# Patient Record
Sex: FEMALE | Race: BLACK OR AFRICAN AMERICAN | Employment: UNEMPLOYED | URBAN - NONMETROPOLITAN AREA
[De-identification: names, ages, dates, MRNs, and addresses within clinical notes are randomized per-mention and may not be internally consistent; named-entity substitution may affect disease eponyms.]

---

## 2021-01-28 ENCOUNTER — HOSPITAL ENCOUNTER (EMERGENCY)
Age: 61
Discharge: HOME | End: 2021-01-28
Payer: MEDICAID

## 2021-01-28 VITALS
HEART RATE: 91 BPM | OXYGEN SATURATION: 98 % | SYSTOLIC BLOOD PRESSURE: 172 MMHG | HEIGHT: 64 IN | RESPIRATION RATE: 18 BRPM | DIASTOLIC BLOOD PRESSURE: 92 MMHG | BODY MASS INDEX: 29.37 KG/M2 | WEIGHT: 172 LBS | TEMPERATURE: 98.2 F

## 2021-01-28 DIAGNOSIS — J34.89 RHINORRHEA: Primary | ICD-10-CM

## 2021-01-28 PROCEDURE — 99282 EMERGENCY DEPT VISIT SF MDM: CPT

## 2021-01-28 RX ORDER — BISMUTH SUBSALICYLATE 262 MG
1 TABLET,CHEWABLE ORAL DAILY
COMMUNITY

## 2021-01-28 NOTE — ED TRIAGE NOTES
Pt states she has been having sinus problems x approx one week, states saw blood when she blew her nose a couple of days ago.   Pt states she gets a sinus infection every year about this time, denies fever

## 2021-01-28 NOTE — ED PROVIDER NOTES
EMERGENCY DEPARTMENT HISTORY AND PHYSICAL EXAM      Date: 1/28/2021  Patient Name: Missy Medina    History of Presenting Illness     Chief Complaint   Patient presents with    Nasal Congestion       History Provided By: Patient    HPI: Missy Medina, 57 y.o. female presents to the ED with complaints of nasal congestion for about six days. Patient states that she began to feel as if her nose was running about six days ago, and soon began to feel as if she were congested. States that she was unsure if it was a sinus infection, but she was unable to get into her primary physician so she decided to come into the ED. She also states that she has experienced whistling when she breathes through her nostrils, and some blood in the nasal discharge as well. Denies any sinus tenderness. There are no other complaints, changes, or physical findings at this time. PCP: No primary care provider on file. No current facility-administered medications on file prior to encounter. Current Outpatient Medications on File Prior to Encounter   Medication Sig Dispense Refill    LISINOPRIL-HYDROCHLOROTHIAZIDE PO Take  by mouth.  buspirone HCl (BUSPIRONE PO) Take  by mouth.  multivitamin (ONE A DAY) tablet Take 1 Tab by mouth daily.  acetaminophen/chlorpheniramine (CORICIDIN PO) Take  by mouth. Past History     Past Medical History:  Past Medical History:   Diagnosis Date    Endocrine disease     Hypertension        Past Surgical History:  Past Surgical History:   Procedure Laterality Date    HX GYN      HX HYSTERECTOMY         Family History:  History reviewed. No pertinent family history. Social History:  Social History     Tobacco Use    Smoking status: Never Smoker    Smokeless tobacco: Never Used   Substance Use Topics    Alcohol use: Not on file    Drug use: Not on file       Allergies:   Allergies   Allergen Reactions    Latex Itching    Ultram [Tramadol] Nausea Only Review of Systems   Review of Systems   Constitutional: Negative for fatigue and fever. HENT: Positive for congestion and rhinorrhea. Negative for facial swelling, sinus pressure and sinus pain. Eyes: Negative for visual disturbance. Respiratory: Negative for shortness of breath. Cardiovascular: Negative for chest pain and leg swelling. Gastrointestinal: Negative for abdominal pain. Endocrine: Negative for polyuria. Genitourinary: Negative for dysuria. Skin: Negative for rash. Neurological: Negative for weakness. Psychiatric/Behavioral: Negative for behavioral problems. Physical Exam   Physical Exam  Vitals signs and nursing note reviewed. Constitutional:       General: She is not in acute distress. Appearance: Normal appearance. She is normal weight. HENT:      Head: Normocephalic and atraumatic. Nose: Congestion present. Right Turbinates: Swollen. Left Turbinates: Swollen. Right Sinus: No maxillary sinus tenderness or frontal sinus tenderness. Left Sinus: No maxillary sinus tenderness or frontal sinus tenderness. Eyes:      Conjunctiva/sclera: Conjunctivae normal.   Neck:      Musculoskeletal: Neck supple. Cardiovascular:      Rate and Rhythm: Normal rate and regular rhythm. Pulmonary:      Effort: Pulmonary effort is normal.      Breath sounds: Normal breath sounds. Musculoskeletal:         General: No signs of injury. Skin:     General: Skin is warm. Neurological:      Mental Status: She is alert and oriented to person, place, and time. Psychiatric:         Mood and Affect: Mood normal.         Diagnostic Study Results     Labs -   No results found for this or any previous visit (from the past 12 hour(s)). Radiologic Studies -   No orders to display     CT Results  (Last 48 hours)    None        CXR Results  (Last 48 hours)    None            Medical Decision Making   I am the first provider for this patient.     I reviewed the vital signs, available nursing notes, past medical history, past surgical history, family history and social history. Vital Signs-Reviewed the patient's vital signs. Patient Vitals for the past 12 hrs:   Temp Pulse Resp BP SpO2   01/28/21 1429 98.2 °F (36.8 °C) 91 18 (!) 172/92 98 %       Records Reviewed: Nursing Notes    The patient presents with     ED Course:   Initial assessment performed. The patients presenting problems have been discussed, and they are in agreement with the care plan formulated and outlined with them. I have encouraged them to ask questions as they arise throughout their visit. Provider Notes (Medical Decision Making):     Patient with rhinorrhea likely due to combination of dry air and mask wearing. No evidence of a bacterial process. Okay for discharge with supportive care. PROCEDURES      Consultations:     Consultations:     Disposition     Disposition:         PLAN:  1. Current Discharge Medication List        2. Follow-up Information    None       Return to ED if worse     Diagnosis     Clinical Impression: No diagnosis found. By signing my name below, Ebenezer Killian, attest that this documentation has been prepared under the direction and in presence of Dr. Juliana Miguel on 01/28/21.  Electronically signed: Chris Dorman, 01/28/21, 2:34 PM

## 2021-04-06 ENCOUNTER — HOSPITAL ENCOUNTER (EMERGENCY)
Age: 61
Discharge: HOME | End: 2021-04-06
Attending: FAMILY MEDICINE
Payer: MEDICAID

## 2021-04-06 VITALS
HEIGHT: 64 IN | TEMPERATURE: 98.1 F | SYSTOLIC BLOOD PRESSURE: 161 MMHG | BODY MASS INDEX: 28.85 KG/M2 | RESPIRATION RATE: 18 BRPM | HEART RATE: 82 BPM | WEIGHT: 169 LBS | DIASTOLIC BLOOD PRESSURE: 100 MMHG | OXYGEN SATURATION: 99 %

## 2021-04-06 DIAGNOSIS — G44.89 OTHER HEADACHE SYNDROME: Primary | ICD-10-CM

## 2021-04-06 PROCEDURE — 99283 EMERGENCY DEPT VISIT LOW MDM: CPT

## 2021-04-06 PROCEDURE — 74011250637 HC RX REV CODE- 250/637: Performed by: FAMILY MEDICINE

## 2021-04-06 RX ORDER — KETOROLAC TROMETHAMINE 10 MG/1
10 TABLET, FILM COATED ORAL
Qty: 10 TAB | Refills: 0 | Status: SHIPPED | OUTPATIENT
Start: 2021-04-06 | End: 2022-01-14

## 2021-04-06 RX ORDER — KETOROLAC TROMETHAMINE 10 MG/1
10 TABLET, FILM COATED ORAL ONCE
Status: COMPLETED | OUTPATIENT
Start: 2021-04-06 | End: 2021-04-06

## 2021-04-06 RX ADMIN — KETOROLAC TROMETHAMINE 10 MG: 10 TABLET, FILM COATED ORAL at 11:28

## 2021-04-06 NOTE — ED TRIAGE NOTES
Pt states \"I have been having headaches since my Hollis Mejia covid vaccine since 3/7/2021. I see a neurologist in 2 days but I couldn't rest last night so I thought I should come in to get checked out. \"

## 2021-04-06 NOTE — ED PROVIDER NOTES
EMERGENCY DEPARTMENT HISTORY AND PHYSICAL EXAM      Date: 4/6/2021  Patient Name: Roni Alcantar    History of Presenting Illness     Chief Complaint   Patient presents with    Headache       History Provided By: Patient    HPI: Roni Alcantar, 57 y.o. female with a past medical history significant hypertension presents to the ED with cc of headache. Patient states that she has had almost daily headaches since getting the first [address] over a month ago. She states the headache is on the left side of her head on the top of her head. She describes it as a throbbing type headache. She says at its worst it gets to an 8 out of 10. Today is a 6 out of 10. She does get some relief with Tylenol. Its not associated with photophobia or nausea. She denies any fevers or chills. She saw her PCP this past week for recheck of her blood pressure which was slightly elevated on the visit. She has been referred to a neurologist and has an appointment in 2 days. The patient states she came in today because she was having a headache last night that kept her up and she was unable to sleep well. She denies any kind of trouble concentrating, weakness, nausea or vomiting. There are no other complaints, changes, or physical findings at this time. PCP: Mago Bliss NP    No current facility-administered medications on file prior to encounter. Current Outpatient Medications on File Prior to Encounter   Medication Sig Dispense Refill    LISINOPRIL-HYDROCHLOROTHIAZIDE PO Take  by mouth.  buspirone HCl (BUSPIRONE PO) Take  by mouth.  multivitamin (ONE A DAY) tablet Take 1 Tab by mouth daily.  acetaminophen/chlorpheniramine (CORICIDIN PO) Take  by mouth.  MV/FA/D3/K/LYCOP/LUT/HERB#220 (ESTROBLEND PO) Take  by mouth.  meloxicam (MOBIC) 15 mg tablet Take 1 Tab by mouth daily.  30 Tab 2    hydrOXYzine (ATARAX) 25 mg tablet Take  by mouth three (3) times daily as needed for Itching.  amLODIPine (NORVASC) 5 mg tablet Take 5 mg by mouth daily.  ASPIRIN PO Take 81 mg by mouth.  MULTIVITAMIN PO Take  by mouth.  CALCIUM PO Take  by mouth. Past History     Past Medical History:  Past Medical History:   Diagnosis Date    Arthritis     Carpal tunnel syndrome, left     Endocrine disease     Hypertension     Numbness and tingling in left hand        Past Surgical History:  Past Surgical History:   Procedure Laterality Date    HX GYN      HX HYSTERECTOMY  2001    HX HYSTERECTOMY         Family History:  Family History   Problem Relation Age of Onset    Diabetes Unknown     Hypertension Unknown     Heart Disease Unknown     Asthma Unknown     Arthritis-osteo Unknown        Social History:  Social History     Tobacco Use    Smoking status: Never Smoker    Smokeless tobacco: Never Used   Substance Use Topics    Alcohol use: No     Alcohol/week: 0.0 standard drinks    Drug use: No       Allergies: Allergies   Allergen Reactions    Latex Itching    Other Plant, Animal, Environmental Other (comments)     Dust - sneezing, runny nose  Cleaning Products ([employer] [employer]) - eye pain    Ultram [Tramadol] Nausea and Vomiting    Ultram [Tramadol] Nausea Only         Review of Systems     Review of Systems   Constitutional: Negative for fatigue and fever. HENT: Negative for rhinorrhea and sore throat. Respiratory: Negative for cough and shortness of breath. Cardiovascular: Negative for chest pain and palpitations. Gastrointestinal: Positive for nausea. Negative for abdominal pain, diarrhea and vomiting. Genitourinary: Negative for difficulty urinating and dysuria. Musculoskeletal: Negative for arthralgias and myalgias. Skin: Negative for color change and rash. Neurological: Positive for light-headedness and headaches. Physical Exam     Physical Exam  Vitals signs and nursing note reviewed. Constitutional:       General: She is awake.  She is not in acute distress. Appearance: Normal appearance. She is well-developed and normal weight. She is not ill-appearing, toxic-appearing or diaphoretic. Interventions: Face mask in place. HENT:      Head: Normocephalic and atraumatic. Eyes:      Conjunctiva/sclera: Conjunctivae normal.      Pupils: Pupils are equal, round, and reactive to light. Neck:      Musculoskeletal: Normal range of motion and neck supple. Cardiovascular:      Rate and Rhythm: Normal rate and regular rhythm. Pulses: Normal pulses. Heart sounds: Normal heart sounds. Pulmonary:      Effort: Pulmonary effort is normal.      Breath sounds: Normal breath sounds. Abdominal:      General: Abdomen is flat. Palpations: Abdomen is soft. Tenderness: There is no abdominal tenderness. Skin:     General: Skin is warm and dry. Neurological:      General: No focal deficit present. Mental Status: She is alert and oriented to person, place, and time. GCS: GCS eye subscore is 4. GCS verbal subscore is 5. GCS motor subscore is 6. Psychiatric:         Mood and Affect: Mood and affect normal.         Behavior: Behavior normal. Behavior is cooperative. Thought Content: Thought content normal.         Lab and Diagnostic Study Results     Labs -   No results found for this or any previous visit (from the past 12 hour(s)). Radiologic Studies -   @lastxrresult@  CT Results  (Last 48 hours)    None        CXR Results  (Last 48 hours)    None            Medical Decision Making   - I am the first provider for this patient. - I reviewed the vital signs, available nursing notes, past medical history, past surgical history, family history and social history. - Initial assessment performed. The patients presenting problems have been discussed, and they are in agreement with the care plan formulated and outlined with them.   I have encouraged them to ask questions as they arise throughout their visit.    Vital Signs-Reviewed the patient's vital signs. Patient Vitals for the past 12 hrs:   Temp Pulse Resp BP SpO2   04/06/21 1052 98.1 °F (36.7 °C) 82 18 (!) 161/100 99 %       Records Reviewed: Nursing Notes    The patient presents with headache with a differential diagnosis of  cluster headache, migraine, tension headache and vascular headache      ED Course:          Provider Notes (Medical Decision Making):     MDM     6168 -patient presents with on and off headaches since getting a vaccine a month ago. She is neurologically intact. Her blood pressure slightly elevated today. She has a bit of anxiety about this. We will try to give her some reassurance given that she has a completely normal exam is neurologically intact. She has an appointment with the neurologist in 2 days. I will allow the neurologist to do the work-up. I will give her some Toradol here in the department as well as a prescription for the same to use as needed. Procedures   Medical Decision Makingedical Decision Making      Disposition   Disposition:     Discharged    DISCHARGE PLAN:  1. Current Discharge Medication List      START taking these medications    Details   ketorolac (TORADOL) 10 mg tablet Take 1 Tab by mouth every six (6) hours as needed for Pain. Qty: 10 Tab, Refills: 0         CONTINUE these medications which have NOT CHANGED    Details   LISINOPRIL-HYDROCHLOROTHIAZIDE PO Take  by mouth. buspirone HCl (BUSPIRONE PO) Take  by mouth. !! multivitamin (ONE A DAY) tablet Take 1 Tab by mouth daily. acetaminophen/chlorpheniramine (CORICIDIN PO) Take  by mouth. MV/FA/D3/K/LYCOP/LUT/HERB#220 (ESTROBLEND PO) Take  by mouth. Associated Diagnoses: Foot pain, right; Ankle pain, right      meloxicam (MOBIC) 15 mg tablet Take 1 Tab by mouth daily. Qty: 30 Tab, Refills: 2      hydrOXYzine (ATARAX) 25 mg tablet Take  by mouth three (3) times daily as needed for Itching.       amLODIPine (NORVASC) 5 mg tablet Take 5 mg by mouth daily. ASPIRIN PO Take 81 mg by mouth. !! MULTIVITAMIN PO Take  by mouth. CALCIUM PO Take  by mouth. !! - Potential duplicate medications found. Please discuss with provider. 2.   Follow-up Information     Follow up With Specialties Details Why Contact Info    Your Neurologist  In 2 days          3. Return to ED if worse   4. Current Discharge Medication List      START taking these medications    Details   ketorolac (TORADOL) 10 mg tablet Take 1 Tab by mouth every six (6) hours as needed for Pain. Qty: 10 Tab, Refills: 0               Diagnosis     Clinical Impression:   1. Other headache syndrome        Attestations:    Ang Ho MD    Please note that this dictation was completed with [employer], the computer voice recognition software. Quite often unanticipated grammatical, syntax, homophones, and other interpretive errors are inadvertently transcribed by the computer software. Please disregard these errors. Please excuse any errors that have escaped final proofreading. Thank you.

## 2021-11-16 LAB — MAMMOGRAPHY, EXTERNAL: NORMAL

## 2022-01-14 ENCOUNTER — HOSPITAL ENCOUNTER (EMERGENCY)
Age: 62
Discharge: HOME | End: 2022-01-14
Attending: STUDENT IN AN ORGANIZED HEALTH CARE EDUCATION/TRAINING PROGRAM
Payer: MEDICAID

## 2022-01-14 VITALS
HEIGHT: 64 IN | BODY MASS INDEX: 28.17 KG/M2 | WEIGHT: 165 LBS | OXYGEN SATURATION: 97 % | HEART RATE: 67 BPM | TEMPERATURE: 98.7 F | SYSTOLIC BLOOD PRESSURE: 155 MMHG | DIASTOLIC BLOOD PRESSURE: 97 MMHG | RESPIRATION RATE: 18 BRPM

## 2022-01-14 DIAGNOSIS — R19.7 DIARRHEA, UNSPECIFIED TYPE: Primary | ICD-10-CM

## 2022-01-14 PROCEDURE — 99282 EMERGENCY DEPT VISIT SF MDM: CPT

## 2022-01-14 RX ORDER — LOPERAMIDE HYDROCHLORIDE 2 MG/1
2 CAPSULE ORAL
Qty: 20 CAPSULE | Refills: 0 | Status: SHIPPED | OUTPATIENT
Start: 2022-01-14 | End: 2022-01-24

## 2022-01-14 RX ORDER — CALCIUM CARB/VITAMIN D3/VIT K1 500-500-40
TABLET,CHEWABLE ORAL
COMMUNITY

## 2022-01-14 RX ORDER — LANOLIN ALCOHOL/MO/W.PET/CERES
CREAM (GRAM) TOPICAL DAILY
COMMUNITY

## 2022-01-14 NOTE — ED PROVIDER NOTES
HPI   Patient is a 43-year-old female who presents for diarrhea. Of note patient has been having diarrhea for the last 2 months. She says she frequently has had frequent stools her whole life over the past 2 months has noticed it has been a little worse than normal.  She typically has approximately seven bowel movements per day. It is nonbloody and not watery. She denies any recent sick contacts, COVID exposures or antibiotic use. She denies any pain or discomfort associated with it. Today she had four bowel movements within the first hour of waking and was associated with some mild nausea so she felt that it was worsening and decided to come to the emergency department. She is never seen any blood. Did not have any vomiting. Still does not have any abdominal discomfort. Denies any runny nose, sore throat or cough. She does have a PCP. Her last colonoscopy was approximately 5 years ago and she had a polyp at that time she was told to follow-up and is due for colonoscopy this year.     Past Medical History:   Diagnosis Date    Arthritis     Carpal tunnel syndrome, left     Endocrine disease     Hypertension     Numbness and tingling in left hand        Past Surgical History:   Procedure Laterality Date    HX GYN      HX HYSTERECTOMY  2001    HX HYSTERECTOMY           Family History:   Problem Relation Age of Onset    Diabetes Other     Hypertension Other     Heart Disease Other     Asthma Other     OSTEOARTHRITIS Other        Social History     Socioeconomic History    Marital status:      Spouse name: Not on file    Number of children: Not on file    Years of education: Not on file    Highest education level: Not on file   Occupational History    Not on file   Tobacco Use    Smoking status: Never Smoker    Smokeless tobacco: Never Used   Substance and Sexual Activity    Alcohol use: No     Alcohol/week: 0.0 standard drinks    Drug use: No    Sexual activity: Not on file Other Topics Concern    Not on file   Social History Narrative    ** Merged History Encounter **          Social Determinants of Health     Financial Resource Strain:     Difficulty of Paying Living Expenses: Not on file   Food Insecurity:     Worried About Running Out of Food in the Last Year: Not on file    Cynthia of Food in the Last Year: Not on file   Transportation Needs:     Lack of Transportation (Medical): Not on file    Lack of Transportation (Non-Medical): Not on file   Physical Activity:     Days of Exercise per Week: Not on file    Minutes of Exercise per Session: Not on file   Stress:     Feeling of Stress : Not on file   Social Connections:     Frequency of Communication with Friends and Family: Not on file    Frequency of Social Gatherings with Friends and Family: Not on file    Attends Denominational Services: Not on file    Active Member of [address] [place] [address] [employer] or Organizations: Not on file    Attends Club or Organization Meetings: Not on file    Marital Status: Not on file   Intimate Partner Violence:     Fear of Current or Ex-Partner: Not on file    Emotionally Abused: Not on file    Physically Abused: Not on file    Sexually Abused: Not on file   Housing Stability:     Unable to Pay for Housing in the Last Year: Not on file    Number of [place] in the Last Year: Not on file    Unstable Housing in the Last Year: Not on file         ALLERGIES: Latex;  Other plant, animal, environmental; Ultram [tramadol]; and Ultram [tramadol]    Review of Systems  Constitutional: No fever  HENT: No ear pain  Eyes: No change in vision  Respiratory: No SOB  Cardio: No chest pain  GI: No blood in stool  : No hematuria  MSK: No back pain  Skin: No rashes  Neuro: No headache    Vitals:    01/14/22 0914   BP: (!) 155/97   Pulse: 67   Resp: 18   Temp: 98.7 °F (37.1 °C)   SpO2: 97%   Weight: 74.8 kg (165 lb)   Height: 5' 4\" (1.626 m)            Physical Exam   General: No acute distress  Head: Normocephalic, atraumatic  Psych: Cooperative and alert  Eyes: No scleral icterus, normal conjunctiva  ENT: Moist oral mucosa  Neck: Supple  CV: Regular rate and rhythm, no pitting edema, palpable radial pulses  Pulm: Clear breath sounds bilaterally without any wheezing or rhonchi, normal respiratory rate  GI: Normal bowel sounds, soft, non-tender  MSK: Moves all four extremities  Skin: No rashes  Neuro: Alert and conversive    MDM   Patient is 41-year-old female who presents with acute on chronic diarrhea. Patient overall is hemodynamically stable and overall appears well. She has been keeping up with hydration status and does not appear dehydrated. She otherwise been eating and drinking normally. She does not have any concerning findings of abdominal pain or tenderness on exam.  Does not have any concerning findings of bleeding in her stool. Does not have any risk factors for C. difficile, parasites or other acute findings. She does have a chronic issue with diarrhea but is never been diagnosed with IBS or inflammatory bowel. From an emergency standpoint she overall appears well. We discussed the importance of keeping up with hydration. We discussed taking Imodium as needed if she is having multiple bowel movements in a day. However I do not think she needs further work-up here in the emergency department. She will be referred to GI    Patient stable for discharge at this time. Patient is in agreement with the plan to be discharged at this time. All the patient's questions were answered. Patient was given written instructions on the diagnosis, and states understanding of the plan moving forward. We did discuss important signs and symptoms that should prompt quick return to the emergency department. Disposition: Patient was discharged home in stable condition.   They will follow up with GI    Prescriptions: Imodium    Diagnosis: Acute on chronic diarrhea    Procedures

## 2022-01-14 NOTE — ED TRIAGE NOTES
Pt states she has always had stomach issues, but for the past 2 months she has had diarrhea. Pt has not called PCP, nor has she taken immodium for the problem. Pt denies vomiting, fevers, or pain.

## 2022-02-10 ENCOUNTER — TRANSCRIBE ORDER (OUTPATIENT)
Dept: SCHEDULING | Age: 62
End: 2022-02-10

## 2022-02-10 DIAGNOSIS — R07.81 RIB PAIN ON RIGHT SIDE: Primary | ICD-10-CM

## 2022-02-15 ENCOUNTER — HOSPITAL ENCOUNTER (OUTPATIENT)
Dept: GENERAL RADIOLOGY | Age: 62
Discharge: HOME | End: 2022-02-15
Payer: MEDICAID

## 2022-02-15 ENCOUNTER — HOSPITAL ENCOUNTER (OUTPATIENT)
Dept: ULTRASOUND IMAGING | Age: 62
Discharge: HOME | End: 2022-02-15
Payer: MEDICAID

## 2022-02-15 ENCOUNTER — TRANSCRIBE ORDER (OUTPATIENT)
Dept: REGISTRATION | Age: 62
End: 2022-02-15

## 2022-02-15 DIAGNOSIS — R07.81 RIB PAIN ON RIGHT SIDE: ICD-10-CM

## 2022-02-15 DIAGNOSIS — R07.81 RIB PAIN ON RIGHT SIDE: Primary | ICD-10-CM

## 2022-02-15 PROCEDURE — 76705 ECHO EXAM OF ABDOMEN: CPT

## 2022-02-15 PROCEDURE — 71101 X-RAY EXAM UNILAT RIBS/CHEST: CPT

## 2022-03-03 ENCOUNTER — OFFICE VISIT (OUTPATIENT)
Dept: GASTROENTEROLOGY | Age: 62
End: 2022-03-03
Payer: MEDICAID

## 2022-03-03 VITALS
SYSTOLIC BLOOD PRESSURE: 125 MMHG | WEIGHT: 165 LBS | HEART RATE: 75 BPM | BODY MASS INDEX: 28.32 KG/M2 | DIASTOLIC BLOOD PRESSURE: 67 MMHG

## 2022-03-03 DIAGNOSIS — R19.7 DIARRHEA, UNSPECIFIED TYPE: Primary | ICD-10-CM

## 2022-03-03 DIAGNOSIS — I10 PRIMARY HYPERTENSION: ICD-10-CM

## 2022-03-03 DIAGNOSIS — Z80.0 FAMILY HISTORY OF COLON CANCER: ICD-10-CM

## 2022-03-03 DIAGNOSIS — Z86.010 HISTORY OF COLON POLYPS: ICD-10-CM

## 2022-03-03 PROCEDURE — 99203 OFFICE O/P NEW LOW 30 MIN: CPT | Performed by: INTERNAL MEDICINE

## 2022-03-03 NOTE — LETTER
3/3/2022    Patient: Colleen Wakefield   YOB: 1960   Date of Visit: 3/3/2022     Maikol Bauer NP  [facility] [id number]  Via Fax: [phone]    Dear Maikol Bauer NP,      Thank you for referring Ms. Annette Cifuentes to [address] [place] [address] for evaluation. My notes for this consultation are attached. If you have questions, please do not hesitate to call me. I look forward to following your patient along with you.       Sincerely,    Guevara Marino MD

## 2022-03-03 NOTE — PROGRESS NOTES
Mayuri Rivera presents today for   Chief Complaint   Patient presents with    New Patient     Back in January patient was having bad diarrhea and since then has now gotten better. Patient thinks the diarrhea came from eating lots of veggies and nuts. ( Last colon was in 2017 due December of this year procedure done by charly)       Is someone accompanying this pt? no    Is the patient using any DME equipment during [address]? no    Depression Screening:  3 most recent PHQ Screens 3/3/2022   Little interest or pleasure in doing things Not at all   Feeling down, depressed, irritable, or hopeless Not at all   Total Score PHQ 2 0       Learning Assessment:  Learning Assessment 10/8/2015   PRIMARY LEARNER Patient   PRIMARY LANGUAGE ENGLISH   LEARNER PREFERENCE PRIMARY LISTENING   ANSWERED BY patient   RELATIONSHIP SELF       Fall Risk  No flowsheet data found. Coordination of Care:  1. Have you been to the ER, urgent care clinic since your last visit? Hospitalized since your last visit? Yes, [facility] for diarrhea    2. Have you seen or consulted any other health care providers outside of the [address] [place] [address] since your last visit? Include any pap smears or colon screening.  Yes, PCP Christen Mojica

## 2022-03-03 NOTE — PROGRESS NOTES
Referring Physician:  Anya Chang NP     Chief Complaint: Diarrhea    Date of service: 03/03/22     Subjective:     History of Present Illness:  Patient is a female BLACK/ 60 y.o.  who is seen for evaluation for diarrhea. The patient has GI complaints of diarrhea intermittently for 4-5 years, occurring 2-3x a week, when eats lots of fruits and vegetables. He diarrhea worsened 12/2021 to 4-5 x a day. She went to the emergency room 01/2022 because of progressive diarrhea. She had no rectal bleeding or melena. No weight loss. She was started on Imodium A-D as needed and reports her diarrhea has resolved. Presently only has diarrhea when eats lots of mixed vegetables and/or milk. Also worse with stress. 2/2022 ultrasound was normal without GI pathology. The patient denies nausea, vomiting, fever, chills, abdominal pain, reflux, dysphagia, change in bowel habits,  constipation, weight loss, rectal bleeding, or melena. Last EGD- never. Last colonoscopy-1/2017 for history of colon polyps. .  Family history for GI disease is significant for brother at age 52. The patient denies liver related risk factors. Past medical history is significant for carpal tunnel syndrome on the left, hypertension, intermittent chronic diarrhea. PMH:  Past Medical History:   Diagnosis Date    Arthritis     Carpal tunnel syndrome, left     Endocrine disease     Hypertension     Numbness and tingling in left hand         PSH:  Past Surgical History:   Procedure Laterality Date    HX GYN      HX HYSTERECTOMY  2001    HX HYSTERECTOMY          Allergies:   Allergies   Allergen Reactions    Latex Itching    Other Plant, Animal, Environmental Other (comments)     Dust - sneezing, runny nose  Cleaning Products (Pine Sol) - eye pain    Ultram [Tramadol] Nausea and Vomiting    Ultram [Tramadol] Nausea Only        Home Medications:  Cannot display prior to admission medications because the patient has not been admitted in this contact. Hospital Medications:  Current Outpatient Medications   Medication Sig    fluticasone propionate (FLONASE ALLERGY RELIEF NA) by Nasal route.  escitalopram oxalate (LEXAPRO PO) Take  by mouth.  TRIAMCINOLONE ACETON-SILICONES EX by Apply Externally route.  peg 400-propylene glycol (SYSTANE) 0.4-0.3 % drop as needed.  cyanocobalamin 1,000 mcg tablet Take  by mouth daily.  cholecalciferol, vitamin d3, (Vitamin D3) 10 mcg (400 unit) cap Take  by mouth.  LISINOPRIL-HYDROCHLOROTHIAZIDE PO Take  by mouth.  multivitamin (ONE A DAY) tablet Take 1 Tab by mouth daily. No current facility-administered medications for this visit. Social History:  Social History     Tobacco Use    Smoking status: Never Smoker    Smokeless tobacco: Never Used   Substance Use Topics    Alcohol use: No     Alcohol/week: 0.0 standard drinks        Pt denies any history of IV drug use, blood transfusions. Family History:  Family History   Problem Relation Age of Onset    Diabetes Other     Hypertension Other     Heart Disease Other     Asthma Other     OSTEOARTHRITIS Other         Review of Systems:  A detailed 10 system ROS is obtained, with pertinent positives as listed above. All others are negative unless listed in history above. Constitutional denies fever chills, headache, or weight loss. Skin- denies lesions or rashes. HEENT- denies any vision or hearing problems, epistaxis, sore throat, or dental problems. Lungs- no shortness of breath or chest pain reported, no dyspnea on exertion. Cardiac- no palpitations or chest pain reported, including at rest or on exertion. GI-no abdominal pain, melena, rectal bleeding, reflux, dysphagia, jaundice, change in stool or urine color, constipation or diarrhea. Genitourinary -no dysuria or hematuria. Musculoskeletal-no muscle weakness or disuse or atrophy.   Neurologic-no numbness, tingling, gait disturbance, or other abnormalities. Rheumatologic- patient denies any immune or rheumatologic diseases or symptoms. Endocrine- patient denies any endocrine abnormalities including thyroid disease or diabetes. Psychologic-patient denies depression, anxiety or emotional issues. No reported memory issues. Objective:     Physical Exam:  Vitals: /67 (BP 1 Location: Right arm, BP Patient Position: Sitting)   Pulse 75   Wt 74.8 kg (165 lb)   BMI 28.32 kg/m²    Gen:  Pt is alert, cooperative, no acute distress  Skin:  Extremities and face reveal no rashes. No serrano erythema. No telangiectasias on the chest wall. HEENT: Sclerae anicteric. Extra-occular muscles are intact. No oral ulcers. No abnormal pigmentation of the lips. The neck is supple. Cardiovascular: Regular rate and rhythm. No murmurs, gallops, or rubs. Respiratory:  Comfortable breathing with no accessory muscle use. Clear breath sounds anteriorly with no wheezes, rales, or rhonchi. GI:  Abdomen nondistended, soft, and nontender. Normal active bowel sounds. No enlargement of the liver or spleen. No masses palpable. Rectal:  Deferred  Musculoskeletal:   No costovertebral tenderness. No localized muscle weakness, or decreased range of motion. Extremities:  No palpable cords or pitting edema of the lower legs. Extremities have good range of motion. Neurological:  Gross memory appears intact. Patient is alert and oriented. Psychiatric:  Mood appears appropriate with judgement intact. Lymphatic:  No cervical or supraclavicular adenopathy. Laboratory:   No results        CT scan of abdomen and pelvis - No results  CT Results (most recent):  No results found for this or any previous visit.          Abdominal US- No results  US Results (most recent):  Results from [place] encounter on 02/15/22    US ABD LTD    Narrative  EXAM: Limited right upper quadrant abdominal ultrasound    INDICATION: Right rib pain.    COMPARISON: None. TECHNIQUE: Real-time abdomen/right upper quadrant sonography in multiple planes  was performed with image documentation. Grayscale, color flow Doppler imaging,  and velocity spectral waveform analysis of the portal vein was performed (duplex  imaging). _______________    FINDINGS:    LIVER: Normal in echotexture. No focal mass. Scattered hepatic cysts measuring  up to 2.7 cm. Color flow Doppler and velocity spectral waveform analysis of the  portal vein shows normal (hepatopetal) direction of flow. Margurette Ventura BILIARY SYSTEM: No intrahepatic biliary dilatation. Common bile duct is normal  in caliber measuring 0.5 cm. GALLBLADDER: No gallstones or gallbladder wall thickening. No pericholecystic  fluid. RIGHT KIDNEY: 10.3 cm in length. No hydronephrosis or renal mass. No visible  calculi. PANCREAS: Head and body are unremarkable in appearance though the tail is  obscured by overlying bowel gas. IVC: Visualized portions are unremarkable in appearance. OTHER: No free intraperitoneal fluid.    _______________    Impression  No gallstones or secondary findings of cholecystitis. MRI and MRCP- No results  MRI Results (most recent):  No results found for this or any previous visit. Assessment:     1. Intermittent chronic diarrhea. I feel her symptom complex is due to irritable bowel syndrome, aggravated by stress and diet (vegetables, fruits, milk). Is resolved with minimal treatment in the form of Imodium A-D, which she uses as needed. There is no warning signs to suggest malignancy or inflammatory bowel disease. 2. History of colon polyps. She is due for surveillance colonoscopy. 3. Hypertension. She is on oral medication for this problem. Plan:     1. Continue to use Imodium AD 1-4x a day as needed for diarrhea, adjusting to effect. Patient also can use it  prophylactically and see if this is beneficial.  2. Colonoscopy with MAC.   Bowel prep magnesium citrate. I discussed the techniques involved with the procedure as well as the risks, benefits, and alternatives including but not limited to bleeding, infection, perforation requiring emergent surgery, missing lesions, death, and anesthesia related complications with the patient. All questions and concerns were answered. The patient voiced understanding and agrees to proceed. She wants to wait and do it sometime later this year. She will call us when she is ready to proceed. 3. Further recommendations pending the patient's clinical course, if needed. 4. The patient will follow up with me as needed.           Chitra Cruz MD

## 2022-06-10 LAB
CREATININE, EXTERNAL: 0.94
LDL-C, EXTERNAL: 168

## 2022-07-11 ENCOUNTER — ANESTHESIA EVENT (OUTPATIENT)
Dept: ENDOSCOPY | Age: 62
End: 2022-07-11
Payer: MEDICAID

## 2022-07-11 RX ORDER — INSULIN LISPRO 100 [IU]/ML
INJECTION, SOLUTION INTRAVENOUS; SUBCUTANEOUS ONCE
Status: CANCELLED | OUTPATIENT
Start: 2022-07-11 | End: 2022-07-11

## 2022-07-14 ENCOUNTER — PREP FOR PROCEDURE (OUTPATIENT)
Dept: GASTROENTEROLOGY | Age: 62
End: 2022-07-14

## 2022-07-14 RX ORDER — SODIUM CHLORIDE, SODIUM LACTATE, POTASSIUM CHLORIDE, CALCIUM CHLORIDE 600; 310; 30; 20 MG/100ML; MG/100ML; MG/100ML; MG/100ML
75 INJECTION, SOLUTION INTRAVENOUS CONTINUOUS
Status: CANCELLED | OUTPATIENT
Start: 2022-07-14 | End: 2022-07-14

## 2022-07-14 NOTE — H&P
Date of Surgery Update:  Sebastien iLriano was seen and examined. History and physical has been reviewed. The patient has been examined.  There have been no significant clinical changes since the completion of the originally dated History and Physical.    Signed By: Bartolo Sadler MD     July 14, 2022 11:52 AM

## 2022-07-19 ENCOUNTER — ANESTHESIA (OUTPATIENT)
Dept: ENDOSCOPY | Age: 62
End: 2022-07-19
Payer: MEDICAID

## 2022-07-19 ENCOUNTER — HOSPITAL ENCOUNTER (OUTPATIENT)
Age: 62
Setting detail: OUTPATIENT SURGERY
Discharge: HOME | End: 2022-07-19
Attending: INTERNAL MEDICINE | Admitting: INTERNAL MEDICINE
Payer: MEDICAID

## 2022-07-19 VITALS
OXYGEN SATURATION: 100 % | HEART RATE: 57 BPM | HEIGHT: 64 IN | DIASTOLIC BLOOD PRESSURE: 74 MMHG | WEIGHT: 167 LBS | SYSTOLIC BLOOD PRESSURE: 134 MMHG | TEMPERATURE: 97.4 F | RESPIRATION RATE: 16 BRPM | BODY MASS INDEX: 28.51 KG/M2

## 2022-07-19 PROCEDURE — 74011250636 HC RX REV CODE- 250/636: Performed by: NURSE ANESTHETIST, CERTIFIED REGISTERED

## 2022-07-19 PROCEDURE — 77030042138 HC TBNG SPECIAL -A: Performed by: INTERNAL MEDICINE

## 2022-07-19 PROCEDURE — 76060000031 HC ANESTHESIA FIRST 0.5 HR: Performed by: INTERNAL MEDICINE

## 2022-07-19 PROCEDURE — 88305 TISSUE EXAM BY PATHOLOGIST: CPT

## 2022-07-19 PROCEDURE — 77030018831 HC SOL IRR H20 BAXT -A: Performed by: INTERNAL MEDICINE

## 2022-07-19 PROCEDURE — 77030037186 HC VLV ENDOSC STRL DEFENDO DISP MVAT -A: Performed by: INTERNAL MEDICINE

## 2022-07-19 PROCEDURE — 76040000019: Performed by: INTERNAL MEDICINE

## 2022-07-19 PROCEDURE — 2709999900 HC NON-CHARGEABLE SUPPLY: Performed by: INTERNAL MEDICINE

## 2022-07-19 PROCEDURE — 77030009426 HC FCPS BIOP ENDOSC BSC -B: Performed by: INTERNAL MEDICINE

## 2022-07-19 PROCEDURE — 45380 COLONOSCOPY AND BIOPSY: CPT | Performed by: INTERNAL MEDICINE

## 2022-07-19 RX ORDER — SODIUM CHLORIDE 0.9 % (FLUSH) 0.9 %
5-40 SYRINGE (ML) INJECTION AS NEEDED
Status: DISCONTINUED | OUTPATIENT
Start: 2022-07-19 | End: 2022-07-19 | Stop reason: HOSPADM

## 2022-07-19 RX ORDER — PROPOFOL 10 MG/ML
INJECTION, EMULSION INTRAVENOUS AS NEEDED
Status: DISCONTINUED | OUTPATIENT
Start: 2022-07-19 | End: 2022-07-19 | Stop reason: HOSPADM

## 2022-07-19 RX ORDER — SODIUM CHLORIDE, SODIUM LACTATE, POTASSIUM CHLORIDE, CALCIUM CHLORIDE 600; 310; 30; 20 MG/100ML; MG/100ML; MG/100ML; MG/100ML
INJECTION, SOLUTION INTRAVENOUS
Status: DISCONTINUED | OUTPATIENT
Start: 2022-07-19 | End: 2022-07-19 | Stop reason: HOSPADM

## 2022-07-19 RX ORDER — SODIUM CHLORIDE 0.9 % (FLUSH) 0.9 %
5-40 SYRINGE (ML) INJECTION EVERY 8 HOURS
Status: CANCELLED | OUTPATIENT
Start: 2022-07-19

## 2022-07-19 RX ORDER — SODIUM CHLORIDE 0.9 % (FLUSH) 0.9 %
5-40 SYRINGE (ML) INJECTION AS NEEDED
Status: CANCELLED | OUTPATIENT
Start: 2022-07-19

## 2022-07-19 RX ORDER — SODIUM CHLORIDE 0.9 % (FLUSH) 0.9 %
5-40 SYRINGE (ML) INJECTION EVERY 8 HOURS
Status: DISCONTINUED | OUTPATIENT
Start: 2022-07-19 | End: 2022-07-19 | Stop reason: HOSPADM

## 2022-07-19 RX ORDER — ONDANSETRON 2 MG/ML
4 INJECTION INTRAMUSCULAR; INTRAVENOUS ONCE
Status: CANCELLED | OUTPATIENT
Start: 2022-07-19 | End: 2022-07-19

## 2022-07-19 RX ORDER — SODIUM CHLORIDE, SODIUM LACTATE, POTASSIUM CHLORIDE, CALCIUM CHLORIDE 600; 310; 30; 20 MG/100ML; MG/100ML; MG/100ML; MG/100ML
25 INJECTION, SOLUTION INTRAVENOUS CONTINUOUS
Status: DISCONTINUED | OUTPATIENT
Start: 2022-07-19 | End: 2022-07-19 | Stop reason: HOSPADM

## 2022-07-19 RX ADMIN — PROPOFOL 50 MG: 10 INJECTION, EMULSION INTRAVENOUS at 11:19

## 2022-07-19 RX ADMIN — PROPOFOL 100 MG: 10 INJECTION, EMULSION INTRAVENOUS at 11:06

## 2022-07-19 RX ADMIN — PROPOFOL 25 MG: 10 INJECTION, EMULSION INTRAVENOUS at 11:12

## 2022-07-19 RX ADMIN — PROPOFOL 50 MG: 10 INJECTION, EMULSION INTRAVENOUS at 11:14

## 2022-07-19 RX ADMIN — SODIUM CHLORIDE, POTASSIUM CHLORIDE, SODIUM LACTATE AND CALCIUM CHLORIDE: 600; 310; 30; 20 INJECTION, SOLUTION INTRAVENOUS at 11:02

## 2022-07-19 RX ADMIN — SODIUM CHLORIDE, POTASSIUM CHLORIDE, SODIUM LACTATE AND CALCIUM CHLORIDE 25 ML/HR: 600; 310; 30; 20 INJECTION, SOLUTION INTRAVENOUS at 08:27

## 2022-07-19 NOTE — ANESTHESIA PREPROCEDURE EVALUATION
Relevant Problems   No relevant active problems       Anesthetic History   No history of anesthetic complications            Review of Systems / Medical History  Patient summary reviewed, nursing notes reviewed and pertinent labs reviewed    Pulmonary  Within defined limits                 Neuro/Psych   Within defined limits           Cardiovascular  Within defined limits  Hypertension              Exercise tolerance: >4 METS     GI/Hepatic/Renal  Within defined limits              Endo/Other  Within defined limits      Arthritis     Other Findings              Physical Exam    Airway  Mallampati: II  TM Distance: 4 - 6 cm  Neck ROM: normal range of motion   Mouth opening: Normal     Cardiovascular  Regular rate and rhythm,  S1 and S2 normal,  no murmur, click, rub, or gallop  Rhythm: regular  Rate: normal         Dental  No notable dental hx       Pulmonary  Breath sounds clear to auscultation               Abdominal  GI exam deferred       Other Findings            Anesthetic Plan    ASA: 2  Anesthesia type: MAC          Induction: Intravenous  Anesthetic plan and risks discussed with: Patient

## 2022-07-19 NOTE — INTERVAL H&P NOTE
Update History & Physical    The Patient's History and Physical of July 19, 2022  The procedure was reviewed with the patient and I examined the patient. There was no change. The surgical site was confirmed by the patient and me. Plan:  The risk, benefits, expected outcome, and alternative to the recommended procedure have been discussed with the patient. Patient understands and wants to proceed with the procedure.     Electronically signed by Luzmaria Glass MD on 7/19/2022 at 8:49 AM

## 2022-07-19 NOTE — DISCHARGE INSTRUCTIONS
1. Check pathology. Will notify patient with results when they are available. 2. Repeat colonoscopy in 5 years for surveillance. 3. Follow up as needed.

## 2022-07-19 NOTE — PROCEDURES
Colonoscopy procedure note    Date of service: 07/19/22     Type: Surveillance    Indication for procedure: History of colon polyps, diarrhea. Anesthesia classification: ASA class 2    Patient history and physical been accomplished and documented. Patient is assessed and determined to be appropriate candidate for planned procedure and sedation; patient reassessed immediately prior to sedation. Sedation plan: MAC per anesthesia    Surgical assistant: Not applicable    Airway assessment: Range of motion: Normal, mouth opening, Visual obstruction: No.    UPDATED PREOP EXAM:  Unchanged. VS: Reviewed  Gen: in NAD  CV: RRR, no murmur  Resp: CTA  Abd: Soft, NTND, +BS  Extrem: No cyanosis or edema  Neuro: Awake and alert    Informed consent obtained: Yes. The indications, risks including but not limited to bleeding, perforation, infection, death, and potential failure to visual areas are diagnosed neoplasia, alternatives and benefits were discussed with the patient prior to the procedure. Patient identity and procedure was verified, absent was obtained, and is consistent with the consent form found in the patient's records. PROCEDURE PERFORMED:  COLONOSCOPY  to the cecum with MAC the polypectomy of small sessile sigmoid colon polyp. INSTRUMENT: Olympus colonoscope per nursing notes. FINDINGS:    External anal lesions: Normal   Rectum: normal.   Retroflexion view: Grade 1 internal hemorrhoids. Sigmoid: normal except for diverticulosis. There is also a small sessile polyp removed by forcep polypectomy without incident. Descending Colon: normal   Transverse Colon: normal   Ascending Colon: normal   Cecum: normal   Terminal ileum: not evaluated     Specimens: 1. Forcep polypectomy of small sessile sigmoid colon polyp. Bowel preparation- adequate to detect small (5mm) polyps or larger. Estimated blood loss: none   Complications:  none   Cecal withdrawal time: 6 minutes.     Comments: none    Impression:  1. Forcep polypectomy of small sessile sigmoid colon polyp. 2. Sigmoid diverticulosis. 3. Grade 1 internal hemorrhoids. Recommendations:  1. Check pathology. Will notify patient with results when they are available. 2. Repeat colonoscopy in 5 years for surveillance. 3. Follow up as needed.

## 2022-08-11 ENCOUNTER — OFFICE VISIT (OUTPATIENT)
Dept: FAMILY MEDICINE CLINIC | Age: 62
End: 2022-08-11
Payer: MEDICAID

## 2022-08-11 ENCOUNTER — HOSPITAL ENCOUNTER (OUTPATIENT)
Dept: GENERAL RADIOLOGY | Age: 62
Discharge: HOME | End: 2022-08-11
Attending: NURSE PRACTITIONER
Payer: MEDICAID

## 2022-08-11 VITALS
HEART RATE: 67 BPM | SYSTOLIC BLOOD PRESSURE: 127 MMHG | RESPIRATION RATE: 18 BRPM | BODY MASS INDEX: 28.17 KG/M2 | OXYGEN SATURATION: 98 % | WEIGHT: 165 LBS | DIASTOLIC BLOOD PRESSURE: 75 MMHG | HEIGHT: 64 IN

## 2022-08-11 DIAGNOSIS — M79.642 LEFT HAND PAIN: ICD-10-CM

## 2022-08-11 DIAGNOSIS — I10 PRIMARY HYPERTENSION: ICD-10-CM

## 2022-08-11 DIAGNOSIS — F41.8 DEPRESSION WITH ANXIETY: ICD-10-CM

## 2022-08-11 DIAGNOSIS — K21.9 GASTROESOPHAGEAL REFLUX DISEASE WITHOUT ESOPHAGITIS: ICD-10-CM

## 2022-08-11 DIAGNOSIS — M79.642 LEFT HAND PAIN: Primary | ICD-10-CM

## 2022-08-11 PROBLEM — E78.5 HYPERLIPIDEMIA: Status: ACTIVE | Noted: 2019-04-01

## 2022-08-11 PROBLEM — J30.9 ALLERGIC RHINITIS: Status: ACTIVE | Noted: 2018-09-24

## 2022-08-11 PROCEDURE — 99203 OFFICE O/P NEW LOW 30 MIN: CPT | Performed by: NURSE PRACTITIONER

## 2022-08-11 PROCEDURE — 73130 X-RAY EXAM OF HAND: CPT

## 2022-08-11 RX ORDER — OMEPRAZOLE 20 MG/1
20 CAPSULE, DELAYED RELEASE ORAL DAILY
Qty: 90 CAPSULE | Refills: 3 | Status: SHIPPED | OUTPATIENT
Start: 2022-08-11

## 2022-08-11 RX ORDER — NYSTATIN 100000 [USP'U]/G
POWDER TOPICAL
COMMUNITY
Start: 2022-05-25

## 2022-08-11 NOTE — PROGRESS NOTES
History of Present Illness  Sheri Oates is a 60 y.o. female who presents today to [facility]. She has previously been seen by [facility]. PMH of HTN, depression/anxiety, and GERD. Reports having labs done a couple of months ago will request medical records. She has several complaints today including left hand pain to serrano side over the 2nd MCP, and increase reflux symptoms. Denies any chest pain, shortness of breath, nausea or vomiting. Appetite is good. Chief Complaint   Patient presents with    [facility]     Right hand finger nail first finger drying out. Left hand first finger hurts          Past Medical History:   Diagnosis Date    Arthritis     Carpal tunnel syndrome, left     Endocrine disease     Hypertension     Numbness and tingling in left hand         Past Surgical History:   Procedure Laterality Date    COLONOSCOPY N/A 7/19/2022    COLONOSCOPY performed by Christian Quintana MD at [facility] ENDOSCOPY    HX GYN      HX HYSTERECTOMY  2001    HX HYSTERECTOMY          Current Medications  Current Outpatient Medications   Medication Sig    Nystop powder APPLY 1 POWDER TOPICALLY TWICE DAILY    omeprazole (PRILOSEC) 20 mg capsule Take 1 Capsule by mouth in the morning. fluticasone propionate (FLONASE ALLERGY RELIEF NA) by Nasal route. escitalopram oxalate (LEXAPRO PO) Take  by mouth. TRIAMCINOLONE ACETON-SILICONES EX by Apply Externally route. peg 400-propylene glycol (SYSTANE) 0.4-0.3 % drop as needed. cyanocobalamin 1,000 mcg tablet Take  by mouth daily. cholecalciferol, vitamin d3, 10 mcg (400 unit) cap Take  by mouth. LISINOPRIL-HYDROCHLOROTHIAZIDE PO Take  by mouth.    multivitamin (ONE A DAY) tablet Take 1 Tab by mouth daily. No current facility-administered medications for this visit.          Allergies   Allergen Reactions    Latex, Natural Rubber Itching    Latex Itching    Tramadol Nausea and Vomiting     Other reaction(s): gi distress    Other Plant, Animal, Environmental Other (comments)     Dust - sneezing, runny nose  Cleaning Products (Pine Sol) - eye pain    Ultram [Tramadol] Nausea Only          Family History   Problem Relation Age of Onset    Diabetes Other     Hypertension Other     Heart Disease Other     Asthma Other     OSTEOARTHRITIS Other           Social History     Tobacco Use    Smoking status: Never    Smokeless tobacco: Never   Vaping Use    Vaping Use: Never used   Substance Use Topics    Alcohol use: No     Alcohol/week: 0.0 standard drinks    Drug use: No        Health Maintenance   Topic Date Due    Hepatitis C Screening  Never done    Lipid Screen  Never done    COVID-19 Vaccine (4 - Booster for Pfizer series) 12/22/2022 (Originally 7/23/2022)    DTaP/Tdap/Td series (1 - Tdap) 08/11/2023 (Originally 1/1/1980)    Shingrix Vaccine Age 50> (1 of 2) 08/26/2023 (Originally 1/1/2009)    Flu Vaccine (1) 09/01/2022    Depression Screen  03/03/2023    Breast Cancer Screen Mammogram  11/16/2023    Colorectal Cancer Screening Combo  07/19/2027    Pneumococcal 0-64 years  Aged [employer] History   Administered Date(s) Administered    COVID-19, PFIZER PURPLE top, DILUTE for use, (age 15 y+), IM, 30mcg/0.3mL 03/07/2021, 09/03/2021, 03/23/2022    Influenza Vaccine 12/16/2016, 10/22/2018       Review of Systems  Review of Systems   Musculoskeletal:         Left hand pain    All other systems reviewed and are negative. Physical Exam  Constitutional:       Appearance: Normal appearance. Cardiovascular:      Rate and Rhythm: Normal rate and regular rhythm. Pulmonary:      Effort: Pulmonary effort is normal.      Breath sounds: Normal breath sounds. Abdominal:      General: Bowel sounds are normal.      Palpations: Abdomen is soft. Musculoskeletal:      Left hand: Tenderness present. Decreased range of motion. Arms:    Skin:     General: Skin is warm.    Neurological:      Mental Status: She is alert and oriented to person, place, and time. Visit Vitals  /75   Pulse 67   Resp 18   Ht 5' 4\" (1.626 m)   Wt 165 lb (74.8 kg)   SpO2 98%   BMI 28.32 kg/m²          Laboratory/Tests:  No visits with results within 3 Month(s) from this visit. Latest known visit with results is:   Results on 04/28/2010   Component Date Value Ref Range Status    Specimen Description: 04/28/2010 URINE   Final    Special Requests: 04/28/2010 CLEAN CATCH   Final    Culture result: 04/28/2010 [id number] COLONIES/mL MIXED GRAM POSITIVE SHANNAN, PROBABLE SKIN/GENITAL CONTAMINATION. Final    Report Status 04/28/2010 04/30/2010 FINAL   Final         Assessment/Plan:    1. Gastroesophageal reflux disease without esophagitis  Will restart omeprazole for her reflux  - omeprazole (PRILOSEC) 20 mg capsule; Take 1 Capsule by mouth in the morning. Dispense: 90 Capsule; Refill: 3    2. Primary hypertension  Stable today, continue Lisinopril-HCTZ    3. Left hand pain  - XR HAND LT MIN 3 V; Future  - REFERRAL TO ORTHOPEDICS    4. Depression with anxiety  Stable of current dose of Lexapro     Follow-up and Dispositions    Return in about 6 months (around 2/11/2023). I have discussed the diagnosis with the patient and the intended plan as seen in the above orders. The patient has received an after-visit summary and questions were answered concerning future plans. I have discussed medication side effects and warnings with the patient as well. I have reviewed the plan of care with the patient, accepted their input and they are in agreement with the treatment goals. Previous lab and imaging results were reviewed by me.        [address] [place] [address], NP-C  August 11, 2022

## 2022-08-11 NOTE — PROGRESS NOTES
History of Present Illness  Eleazar Hopson is a 60 y.o. female who presents today to establish care. Chief Complaint   Patient presents with    Establish Care     Right hand finger nail first finger drying out. Left hand first finger hurts            Past Medical History:   Diagnosis Date    Arthritis     Carpal tunnel syndrome, left     Endocrine disease     Hypertension     Numbness and tingling in left hand         Past Surgical History:   Procedure Laterality Date    COLONOSCOPY N/A 7/19/2022    COLONOSCOPY performed by Heaven Anders MD at [facility] ENDOSCOPY    HX GYN      HX HYSTERECTOMY  2001    HX HYSTERECTOMY          Current Medications  Current Outpatient Medications   Medication Sig    Nystop powder APPLY 1 POWDER TOPICALLY TWICE DAILY    fluticasone propionate (FLONASE ALLERGY RELIEF NA) by Nasal route. escitalopram oxalate (LEXAPRO PO) Take  by mouth. TRIAMCINOLONE ACETON-SILICONES EX by Apply Externally route. peg 400-propylene glycol (SYSTANE) 0.4-0.3 % drop as needed. cyanocobalamin 1,000 mcg tablet Take  by mouth daily. cholecalciferol, vitamin d3, 10 mcg (400 unit) cap Take  by mouth. LISINOPRIL-HYDROCHLOROTHIAZIDE PO Take  by mouth.    multivitamin (ONE A DAY) tablet Take 1 Tab by mouth daily. No current facility-administered medications for this visit.          Allergies   Allergen Reactions    Latex Itching    Other Plant, Animal, Environmental Other (comments)     Dust - sneezing, runny nose  Cleaning Products (Pine Sol) - eye pain    Ultram [Tramadol] Nausea and Vomiting    Ultram [Tramadol] Nausea Only          Family History   Problem Relation Age of Onset    Diabetes Other     Hypertension Other     Heart Disease Other     Asthma Other     OSTEOARTHRITIS Other           Social History     Tobacco Use    Smoking status: Never    Smokeless tobacco: Never   Vaping Use    Vaping Use: Never used   Substance Use Topics    Alcohol use: No     Alcohol/week: 0.0 standard drinks    Drug use: No        Health Maintenance   Topic Date Due    Hepatitis C Screening  Never done    Lipid Screen  Never done    COVID-19 Vaccine (4 - Booster for Pfizer series) 12/22/2022 (Originally 7/23/2022)    DTaP/Tdap/Td series (1 - Tdap) 08/11/2023 (Originally 1/1/1980)    Shingrix Vaccine Age 50> (1 of 2) 08/26/2023 (Originally 1/1/2009)    Flu Vaccine (1) 09/01/2022    Depression Screen  03/03/2023    Breast Cancer Screen Mammogram  11/16/2023    Colorectal Cancer Screening Combo  07/19/2032    Pneumococcal 0-64 years  Aged [employer] History   Administered Date(s) Administered    COVID-19, PFIZER PURPLE top, DILUTE for use, (age 15 y+), IM, 30mcg/0.3mL 03/07/2021, 09/03/2021, 03/23/2022    Influenza Vaccine 12/16/2016, 10/22/2018       Review of Systems  Review of Systems   Musculoskeletal:         Left palmar pain plantar over first    All other systems reviewed and are negative. Physical Exam       Visit Vitals  /75   Pulse 67   Resp 18   Ht 5' 4\" (1.626 m)   Wt 165 lb (74.8 kg)   SpO2 98%   BMI 28.32 kg/m²          Laboratory/Tests:  No visits with results within 3 Month(s) from this visit. Latest known visit with results is:   Results on 04/28/2010   Component Date Value Ref Range Status    Specimen Description: 04/28/2010 URINE   Final    Special Requests: 04/28/2010 CLEAN CATCH   Final    Culture result: 04/28/2010 [id number] COLONIES/mL MIXED GRAM POSITIVE SHANNAN, PROBABLE SKIN/GENITAL CONTAMINATION. Final    Report Status 04/28/2010 04/30/2010 FINAL   Final         Assessment/Plan:    1. Need for hepatitis C screening test  ***    2. Preventative health care  ***    3. Encounter for screening mammogram for malignant neoplasm of breast  ***    4. Screening for colon cancer  ***           I have discussed the diagnosis with the patient and the intended plan as seen in the above orders.   The patient has received an after-visit summary and questions were answered concerning future plans. I have discussed medication side effects and warnings with the patient as well. I have reviewed the plan of care with the patient, accepted their input and they are in agreement with the treatment goals. Previous lab and imaging results were reviewed by me.        [address] [place] [address], [place], NP-C  August 11, 2022

## 2022-08-11 NOTE — PROGRESS NOTES
Vinita Mc presents today for   Chief Complaint   Patient presents with    Establish Care     Right hand finger nail first finger drying out. Left hand first finger hurts        Is someone accompanying this pt? NO    Is the patient using any DME equipment during OV? NO    Depression Screening:  3 most recent PHQ Screens 8/11/2022   Little interest or pleasure in doing things Not at all   Feeling down, depressed, irritable, or hopeless Not at all   Total Score PHQ 2 0       Learning Assessment:  Learning Assessment 10/8/2015   PRIMARY LEARNER Patient   PRIMARY LANGUAGE ENGLISH   LEARNER PREFERENCE PRIMARY LISTENING   ANSWERED BY patient   RELATIONSHIP SELF       Fall Risk  No flowsheet data found. Health Maintenance reviewed and discussed and ordered per Provider. Health Maintenance Due   Topic Date Due    Hepatitis C Screening  Never done    Lipid Screen  Never done   . Coordination of Care:    1. \"Have you been to the ER, urgent care clinic since your last visit? Hospitalized since your last visit? \" No    2. \"Have you seen or consulted any other health care providers outside of the [address] [place] [address] since your last visit? \" No     3. For patients aged 39-70: Has the patient had a colonoscopy? Yes - no Care Gap present     If the patient is female:    4. For patients aged 41-77: Has the patient had a mammogram within the past 2 years? No    5. For patients aged 21-65: Has the patient had a pap smear?  No

## 2022-08-29 ENCOUNTER — HOSPITAL ENCOUNTER (OUTPATIENT)
Dept: LAB | Age: 62
Discharge: HOME | End: 2022-08-29

## 2022-08-29 ENCOUNTER — OFFICE VISIT (OUTPATIENT)
Dept: FAMILY MEDICINE CLINIC | Age: 62
End: 2022-08-29
Payer: MEDICAID

## 2022-08-29 VITALS
HEART RATE: 64 BPM | RESPIRATION RATE: 20 BRPM | BODY MASS INDEX: 28.58 KG/M2 | HEIGHT: 64 IN | WEIGHT: 167.4 LBS | SYSTOLIC BLOOD PRESSURE: 132 MMHG | DIASTOLIC BLOOD PRESSURE: 80 MMHG

## 2022-08-29 DIAGNOSIS — I88.9 LYMPHADENITIS: Primary | ICD-10-CM

## 2022-08-29 DIAGNOSIS — Z87.898 HX OF ABNORMAL MAMMOGRAM: ICD-10-CM

## 2022-08-29 PROCEDURE — 99213 OFFICE O/P EST LOW 20 MIN: CPT | Performed by: NURSE PRACTITIONER

## 2022-08-29 PROCEDURE — 99001 SPECIMEN HANDLING PT-LAB: CPT

## 2022-08-29 NOTE — PROGRESS NOTES
Phillip Henriquez presents today for underarm pain, right arm. Patient states that this pain started about a year ago last September. Patient states that this is on and off. Patient states that she feels a bump in her underarm pit area. Patient states that there has been no discharge. Patient states that this is under the skin and is tender to the touch. Patient states that the pain is like an achy feeling. Patient would also like to have her right elbow looked at. Patient states that she experiences pain when she presses down on it. Patient describes the pain achy pain that is about a 5 on the pain scale. Chief Complaint   Patient presents with    Follow-up     Underarm pain       Is someone accompanying this pt? No    Is the patient using any DME equipment during OV? No    Depression Screening:  3 most recent PHQ Screens 8/29/2022   Little interest or pleasure in doing things Not at all   Feeling down, depressed, irritable, or hopeless Not at all   Total Score PHQ 2 0       Learning Assessment:  Learning Assessment 10/8/2015   PRIMARY LEARNER Patient   PRIMARY LANGUAGE ENGLISH   LEARNER PREFERENCE PRIMARY LISTENING   ANSWERED BY patient   RELATIONSHIP SELF       Abuse Screening:  No flowsheet data found. Fall Risk  No flowsheet data found. ADL  No flowsheet data found. Health Maintenance reviewed and discussed and ordered per Provider. Health Maintenance Due   Topic Date Due    Hepatitis C Screening  Never done   . Coordination of Care:  1. Have you been to the ER, urgent care clinic since your last visit? Hospitalized since your last visit? No    2. Have you seen or consulted any other health care providers outside of the [address] [place] [address] since your last visit? Include any pap smears or colon screening.  No

## 2022-08-29 NOTE — PROGRESS NOTES
Griselda Wilson (: 1960) is a 60 y.o. female, established patient, here for evaluation of the following chief complaint(s):  Follow-up (Underarm pain right arm)       ASSESSMENT/PLAN:  Below is the assessment and plan developed based on review of pertinent history, physical exam, labs, studies, and medications. 1. Lymphadenitis  Will obtain inflammatory markers, no tenderness with palpation  -     CBC WITH AUTOMATED DIFF  -     SED RATE (ESR)  -     C REACTIVE PROTEIN, QT  2. Hx of abnormal mammogram  Needs to schedule after 22  -     [facility] MAMMO BI DX INCL CAD; Future    No follow-ups on file. SUBJECTIVE/OBJECTIVE:  DESTINI Berg presents today with complaints of  \"achy feeling\"  to her right elbow after bumping it several months ago. Also c/o Patient states that she feels a bump in her right underarm pit. Patient states that there has been no discharge. Patient states that this is under the skin and is tender to the touch. Patient states that the pain is like an achy feeling. Review of Systems   Musculoskeletal:         Right elbow tenderness  Right arm pit tenderness   All other systems reviewed and are negative. Physical Exam  Vitals reviewed. Cardiovascular:      Rate and Rhythm: Normal rate and regular rhythm. Pulses: Normal pulses. Heart sounds: Normal heart sounds. Pulmonary:      Effort: Pulmonary effort is normal.      Breath sounds: Normal breath sounds. Chest:   Breasts:     Right: Axillary adenopathy present. Left: No axillary adenopathy. Abdominal:      General: Bowel sounds are normal.   Musculoskeletal:      Right elbow: Normal range of motion. Tenderness present. Lymphadenopathy:      Upper Body:      Right upper body: Axillary adenopathy present. Left upper body: No axillary adenopathy. Neurological:      Mental Status: She is alert and oriented to person, place, and time.              An electronic signature was used to authenticate this note.   -- [address] [facility], NP-C

## 2022-08-31 LAB
BASOPHILS # BLD AUTO: 0 X10E3/UL (ref 0–0.2)
BASOPHILS NFR BLD AUTO: 0 %
CRP SERPL-MCNC: 3 MG/L (ref 0–10)
EOSINOPHIL # BLD AUTO: 0.1 X10E3/UL (ref 0–0.4)
EOSINOPHIL NFR BLD AUTO: 1 %
ERYTHROCYTE [DISTWIDTH] IN BLOOD BY AUTOMATED COUNT: 12.2 % (ref 11.7–15.4)
ERYTHROCYTE [SEDIMENTATION RATE] IN BLOOD BY WESTERGREN METHOD: 22 MM/HR (ref 0–40)
HCT VFR BLD AUTO: 36.4 % (ref 34–46.6)
HGB BLD-MCNC: 12 G/DL (ref 11.1–15.9)
IMM GRANULOCYTES # BLD AUTO: 0 X10E3/UL (ref 0–0.1)
IMM GRANULOCYTES NFR BLD AUTO: 0 %
LYMPHOCYTES # BLD AUTO: 2.3 X10E3/UL (ref 0.7–3.1)
LYMPHOCYTES NFR BLD AUTO: 40 %
MCH RBC QN AUTO: 29.9 PG (ref 26.6–33)
MCHC RBC AUTO-ENTMCNC: 33 G/DL (ref 31.5–35.7)
MCV RBC AUTO: 91 FL (ref 79–97)
MONOCYTES # BLD AUTO: 0.5 X10E3/UL (ref 0.1–0.9)
MONOCYTES NFR BLD AUTO: 8 %
NEUTROPHILS # BLD AUTO: 2.9 X10E3/UL (ref 1.4–7)
NEUTROPHILS NFR BLD AUTO: 51 %
PLATELET # BLD AUTO: 184 X10E3/UL (ref 150–450)
RBC # BLD AUTO: 4.02 X10E6/UL (ref 3.77–5.28)
WBC # BLD AUTO: 5.7 X10E3/UL (ref 3.4–10.8)

## 2022-09-01 NOTE — PROGRESS NOTES
Please notify patient all of her labs were normal. Continue to monitor the area under her arm if she feels it is getting bigger or painful to make a follow up appointment

## 2022-09-12 ENCOUNTER — TELEPHONE (OUTPATIENT)
Dept: FAMILY MEDICINE CLINIC | Age: 62
End: 2022-09-12

## 2022-09-12 DIAGNOSIS — Z87.898 HX OF ABNORMAL MAMMOGRAM: Primary | ICD-10-CM

## 2022-09-12 NOTE — TELEPHONE ENCOUNTER
Massiel Marleny wants to get her mammogram done at [facility] needs order to be sent.   Fax # [phone]

## 2022-09-23 ENCOUNTER — OFFICE VISIT (OUTPATIENT)
Dept: ORTHOPEDIC SURGERY | Age: 62
End: 2022-09-23
Payer: MEDICAID

## 2022-09-23 VITALS
OXYGEN SATURATION: 100 % | TEMPERATURE: 97.1 F | HEART RATE: 71 BPM | BODY MASS INDEX: 28.34 KG/M2 | HEIGHT: 64 IN | WEIGHT: 166 LBS

## 2022-09-23 DIAGNOSIS — M15.2 DEGENERATIVE ARTHRITIS OF PROXIMAL INTERPHALANGEAL JOINT OF INDEX FINGER OF LEFT HAND: ICD-10-CM

## 2022-09-23 DIAGNOSIS — M19.031 PRIMARY OSTEOARTHRITIS OF RIGHT WRIST: Primary | ICD-10-CM

## 2022-09-23 PROCEDURE — 73110 X-RAY EXAM OF WRIST: CPT | Performed by: ORTHOPAEDIC SURGERY

## 2022-09-23 PROCEDURE — 99203 OFFICE O/P NEW LOW 30 MIN: CPT | Performed by: ORTHOPAEDIC SURGERY

## 2022-09-23 RX ORDER — MELOXICAM 15 MG/1
15 TABLET ORAL DAILY
Qty: 30 TABLET | Refills: 0 | Status: SHIPPED | OUTPATIENT
Start: 2022-09-23 | End: 2022-10-23

## 2022-09-23 NOTE — PROGRESS NOTES
Junaid Ibarra is a 60 y.o. female left handed individual, not currently working. Worker's Compensation and legal considerations: not known. Vitals:    09/23/22 0937   Pulse: 71   Temp: 97.1 °F (36.2 °C)   TempSrc: Temporal   SpO2: 100%   Weight: 166 lb (75.3 kg)   Height: 5' 4\" (1.626 m)   PainSc:   5   PainLoc: Wrist           Chief Complaint   Patient presents with    Wrist Pain     Right     Hand Pain     Left index finger         HPI: Patient presents today with a history of right wrist pain and left index finger pain. Date of onset: Indeterminate    Injury: No    Prior Treatment:  No    Numbness/ Tingling: No      ROS: Review of Systems - General ROS: negative  Psychological ROS: negative  ENT ROS: negative  Allergy and Immunology ROS: negative  Hematological and Lymphatic ROS: negative  Respiratory ROS: no cough, shortness of breath, or wheezing  Cardiovascular ROS: no chest pain or dyspnea on exertion  Gastrointestinal ROS: no abdominal pain, change in bowel habits, or black or bloody stools  Musculoskeletal ROS: negative  Neurological ROS: negative  Dermatological ROS: negative    Past Medical History:   Diagnosis Date    Arthritis     Carpal tunnel syndrome, left     Endocrine disease     Hypertension     Numbness and tingling in left hand        Past Surgical History:   Procedure Laterality Date    COLONOSCOPY N/A 7/19/2022    COLONOSCOPY performed by Kathleen Garza MD at [facility] ENDOSCOPY    HX GYN      HX HYSTERECTOMY  2001    HX HYSTERECTOMY         Current Outpatient Medications   Medication Sig Dispense Refill    meloxicam (MOBIC) 15 mg tablet Take 1 Tablet by mouth daily for 30 days. 30 Tablet 0    Nystop powder APPLY 1 POWDER TOPICALLY TWICE DAILY      omeprazole (PRILOSEC) 20 mg capsule Take 1 Capsule by mouth in the morning. 90 Capsule 3    fluticasone propionate (FLONASE ALLERGY RELIEF NA) by Nasal route.  escitalopram oxalate (LEXAPRO PO) Take  by mouth.  TRIAMCINOLONE ACETON-SILICONES EX by Apply Externally route.  peg 400-propylene glycol (SYSTANE) 0.4-0.3 % drop as needed.  cyanocobalamin 1,000 mcg tablet Take  by mouth daily.  cholecalciferol, vitamin d3, 10 mcg (400 unit) cap Take  by mouth.  LISINOPRIL-HYDROCHLOROTHIAZIDE PO Take  by mouth.  multivitamin (ONE A DAY) tablet Take 1 Tab by mouth daily. Allergies   Allergen Reactions    Latex, Natural Rubber Itching    Latex Itching    Tramadol Nausea and Vomiting     Other reaction(s): gi distress    Other Plant, Animal, Environmental Other (comments)     Dust - sneezing, runny nose  Cleaning Products (Pine Sol) - eye pain    Ultram [Tramadol] Nausea Only           PE:     Physical Exam  Vitals and nursing note reviewed. Constitutional:       General: She is not in acute distress. Appearance: Normal appearance. She is not ill-appearing. Cardiovascular:      Pulses: Normal pulses. Pulmonary:      Effort: Pulmonary effort is normal. No respiratory distress. Musculoskeletal:         General: Swelling and tenderness present. No deformity or signs of injury. Normal range of motion. Cervical back: Normal range of motion and neck supple. Right lower leg: No edema. Left lower leg: No edema. Skin:     General: Skin is warm and dry. Capillary Refill: Capillary refill takes less than 2 seconds. Findings: No bruising or erythema. Neurological:      General: No focal deficit present. Mental Status: She is alert and oriented to person, place, and time. Psychiatric:         Mood and Affect: Mood normal.         Behavior: Behavior normal.          Wrist: Tenderness localized to the right radiocarpal joint dorsally.     Tenderness L R Test L R   1st Ext Comp - - Finkelstein's - -   Snuff Box - - Garcia - -   2nd Ext Comp - - S-L Shear - -   S-L Joint - - L-T Shear - -   L-T Joint - - DRUJ Sup - -   6th Ext Comp - - DRUJ Pro - -   Ulnar Snuff - - DRUJ Grind - -   Fovea - - TFCC - -   STT Joint - - Mid-Carp Inst - -   FCR - - P-T Grind - -   Intersection - - ECU Sublux. - -      Dorsal Ganglion: -   Volar Ganglion: -      ROM: Full      Left hand: There is tenderness to palpation at the PIP joint of the index finger. Neurovascularly intact distally and range of motion full. Imagin2022 3 views of right wrist positive for degenerative changes at the radiocarpal and intercarpal joints. External plain films of left hand is significant for mild degenerative changes of the index finger PIP joint. ICD-10-CM ICD-9-CM    1. Primary osteoarthritis of right wrist  M19.031 715.13 AMB POC XRAY, WRIST; COMPLETE, 3+ VIE      meloxicam (MOBIC) 15 mg tablet      2. 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 We also discussed the possibility of injections in the future if the anti-inflammatories are not doing enough for her. Today she does not appear to be in significant enough pain to warrant an injection. She agrees with this plan. Follow-up and Dispositions    Return if symptoms worsen or fail to improve.           Plan was reviewed with patient, who verbalized agreement and understanding of the plan

## 2022-09-26 RX ORDER — ESCITALOPRAM OXALATE 5 MG/1
5 TABLET ORAL DAILY
Qty: 90 TABLET | Refills: 1 | Status: SHIPPED | OUTPATIENT
Start: 2022-09-26

## 2022-09-26 NOTE — TELEPHONE ENCOUNTER
Patient called this morning requesting refill for escitalopram oxalate.        Call back #  [phone]

## 2022-09-28 NOTE — TELEPHONE ENCOUNTER
For Wilmar Barron in place:   Recommendation Provided To:    Intervention Detail: New Rx: 1, reason: Patient Preference  Gap Closed?:   Intervention Accepted By:   Time Spent (min): 5

## 2022-10-03 RX ORDER — LISINOPRIL AND HYDROCHLOROTHIAZIDE 20; 25 MG/1; MG/1
1 TABLET ORAL DAILY
Qty: 90 TABLET | Refills: 3 | Status: SHIPPED | OUTPATIENT
Start: 2022-10-03

## 2022-10-03 RX ORDER — FLUTICASONE PROPIONATE 50 MCG
SPRAY, SUSPENSION (ML) NASAL
Qty: 1 EACH | Refills: 3 | Status: SHIPPED | OUTPATIENT
Start: 2022-10-03

## 2022-10-03 NOTE — TELEPHONE ENCOUNTER
Requested Prescriptions     Pending Prescriptions Disp Refills    fluticasone propionate (Flonase Allergy Relief) 50 mcg/actuation nasal spray      lisinopril-hydroCHLOROthiazide (PRINZIDE, ZESTORETIC) 20-25 mg per tablet 90 Tablet 3     Sig: Take 1 Tablet by mouth daily.

## 2022-10-04 NOTE — TELEPHONE ENCOUNTER
Hospital called in regards to patient, she will be getting her mammogram done tomorrow. They need a order faxed over for a bilateral diagnostic mammogram and ultra sound if needed.      Fax number;  [phone]

## 2022-10-06 DIAGNOSIS — Z87.898 HX OF ABNORMAL MAMMOGRAM: ICD-10-CM

## 2022-12-09 ENCOUNTER — OFFICE VISIT (OUTPATIENT)
Dept: ORTHOPEDIC SURGERY | Age: 62
End: 2022-12-09
Payer: MEDICAID

## 2022-12-09 VITALS — TEMPERATURE: 96.1 F | WEIGHT: 166 LBS | BODY MASS INDEX: 28.49 KG/M2

## 2022-12-09 DIAGNOSIS — M19.031 PRIMARY OSTEOARTHRITIS OF RIGHT WRIST: Primary | ICD-10-CM

## 2022-12-09 DIAGNOSIS — M77.8 RIGHT WRIST TENDINITIS: ICD-10-CM

## 2022-12-09 NOTE — Clinical Note
12/9/2022    Patient: Boston Shelton   YOB: 1960   Date of Visit: 12/9/2022     Carin Ramos MD  [facility] [id number]  Via In [facility], [address] [place] [address]  Via     Dear Carin Ramos, [address] [facility], Student,      Thank you for referring Ms. Laverna Moritz to [address] for evaluation. My notes for this consultation are attached. If you have questions, please do not hesitate to call me. I look forward to following your patient along with you.       Sincerely,    Eric Sinha, DO

## 2022-12-09 NOTE — PROGRESS NOTES
Boston Shelton is a 60 y.o. female left handed individual, not currently working. Worker's Compensation and legal considerations: not known. Vitals:    12/09/22 1146   Temp: (!) 96.1 °F (35.6 °C)   Weight: 166 lb (75.3 kg)   PainSc:   7   PainLoc: Wrist           Chief Complaint   Patient presents with    Wrist Pain     right       HPI: Patient returns today with some continued pain. She tried the meloxicam which did help somewhat. Initial HPI: Patient presents today with a history of right wrist pain and left index finger pain. Date of onset: Indeterminate    Injury: No    Prior Treatment:  No    Numbness/ Tingling: No      ROS: Review of Systems - General ROS: negative  Psychological ROS: negative  ENT ROS: negative  Allergy and Immunology ROS: negative  Hematological and Lymphatic ROS: negative  Respiratory ROS: no cough, shortness of breath, or wheezing  Cardiovascular ROS: no chest pain or dyspnea on exertion  Gastrointestinal ROS: no abdominal pain, change in bowel habits, or black or bloody stools  Musculoskeletal ROS: negative  Neurological ROS: negative  Dermatological ROS: negative    Past Medical History:   Diagnosis Date    Arthritis     Carpal tunnel syndrome, left     Endocrine disease     Hypertension     Numbness and tingling in left hand        Past Surgical History:   Procedure Laterality Date    COLONOSCOPY N/A 7/19/2022    COLONOSCOPY performed by Jeovanny Carey MD at [facility] ENDOSCOPY    HX GYN      HX HYSTERECTOMY  2001    HX HYSTERECTOMY         Current Outpatient Medications   Medication Sig Dispense Refill    fluticasone propionate (Flonase Allergy Relief) 50 mcg/actuation nasal spray two sprays in each nostril once a day for the first week. After that, use one or two sprays in each nostril once a day as needed. 1 Each 3    lisinopril-hydroCHLOROthiazide (PRINZIDE, ZESTORETIC) 20-25 mg per tablet Take 1 Tablet by mouth daily.  90 Tablet 3    escitalopram oxalate (Lexapro) 5 mg tablet Take 1 Tablet by mouth daily. 90 Tablet 1    Nystop powder APPLY 1 POWDER TOPICALLY TWICE DAILY      omeprazole (PRILOSEC) 20 mg capsule Take 1 Capsule by mouth in the morning. 90 Capsule 3    TRIAMCINOLONE ACETON-SILICONES EX by Apply Externally route. peg 400-propylene glycol (SYSTANE) 0.4-0.3 % drop as needed. cyanocobalamin 1,000 mcg tablet Take  by mouth daily. cholecalciferol, vitamin d3, 10 mcg (400 unit) cap Take  by mouth.      multivitamin (ONE A DAY) tablet Take 1 Tab by mouth daily. Allergies   Allergen Reactions    Latex, Natural Rubber Itching    Latex Itching    Tramadol Nausea and Vomiting     Other reaction(s): gi distress    Other Plant, Animal, Environmental Other (comments)     Dust - sneezing, runny nose  Cleaning Products (Pine Sol) - eye pain    Ultram [Tramadol] Nausea Only           PE:     Physical Exam  Vitals and nursing note reviewed. Constitutional:       General: She is not in acute distress. Appearance: Normal appearance. She is not ill-appearing. Cardiovascular:      Pulses: Normal pulses. Pulmonary:      Effort: Pulmonary effort is normal. No respiratory distress. Musculoskeletal:         General: Swelling and tenderness present. No deformity or signs of injury. Normal range of motion. Cervical back: Normal range of motion and neck supple. Right lower leg: No edema. Left lower leg: No edema. Skin:     General: Skin is warm and dry. Capillary Refill: Capillary refill takes less than 2 seconds. Findings: No bruising or erythema. Neurological:      General: No focal deficit present. Mental Status: She is alert and oriented to person, place, and time. Psychiatric:         Mood and Affect: Mood normal.         Behavior: Behavior normal.          Wrist: Tenderness localized to the FCU tendon sheath.     Tenderness L R Test L R   1st Ext Comp - - Finkelstein's - -   Snuff Box - - Garcia - -   2nd Ext Comp - - S-L Shear - -   S-L Joint - - L-T Shear - -   L-T Joint - - DRUJ Sup - -   6th Ext Comp - - DRUJ Pro - -   Ulnar Snuff - - DRUJ Grind - -   Fovea - - TFCC - -   STT Joint - - Mid-Carp Inst - -   FCR - - P-T Grind - -   Intersection - - ECU Sublux. - -      Dorsal Ganglion: -   Volar Ganglion: -      ROM: Full      Right wrist: Some continued tenderness in the FCU tendon distribution with minimal wrist tenderness. Imagin2022 3 views of right wrist positive for degenerative changes at the radiocarpal and intercarpal joints. External plain films of left hand is significant for mild degenerative changes of the index finger PIP joint. ICD-10-CM ICD-9-CM    1. Primary osteoarthritis of right wrist  M19.031 715.13 REFERRAL TO OCCUPATIONAL THERAPY      2. Right wrist tendinitis  M77.8 727.05 REFERRAL TO OCCUPATIONAL THERAPY            Plan:     Referral to Occupational Therapy for range of motion exercises and other modalities. Follow-up and Dispositions    Return if symptoms worsen or fail to improve.           Plan was reviewed with patient, who verbalized agreement and understanding of the plan

## 2022-12-15 ENCOUNTER — TELEPHONE (OUTPATIENT)
Dept: FAMILY MEDICINE CLINIC | Age: 62
End: 2022-12-15

## 2022-12-15 NOTE — TELEPHONE ENCOUNTER
Patient called stating she received a letter from her insurance company stating she needs a prior authorization for her omeprazole.

## 2023-02-08 ENCOUNTER — HOSPITAL ENCOUNTER (OUTPATIENT)
Dept: LAB | Age: 63
Discharge: HOME | End: 2023-02-08

## 2023-02-08 ENCOUNTER — OFFICE VISIT (OUTPATIENT)
Dept: FAMILY MEDICINE CLINIC | Age: 63
End: 2023-02-08
Payer: MEDICAID

## 2023-02-08 VITALS
HEIGHT: 64 IN | OXYGEN SATURATION: 96 % | WEIGHT: 164.5 LBS | BODY MASS INDEX: 28.09 KG/M2 | TEMPERATURE: 97.6 F | DIASTOLIC BLOOD PRESSURE: 66 MMHG | SYSTOLIC BLOOD PRESSURE: 118 MMHG | HEART RATE: 75 BPM

## 2023-02-08 DIAGNOSIS — R73.9 HYPERGLYCEMIA: ICD-10-CM

## 2023-02-08 DIAGNOSIS — F41.1 GENERALIZED ANXIETY DISORDER: Primary | ICD-10-CM

## 2023-02-08 DIAGNOSIS — M17.12 LOCALIZED OSTEOARTHRITIS OF LEFT KNEE: ICD-10-CM

## 2023-02-08 DIAGNOSIS — Z11.59 NEED FOR HEPATITIS C SCREENING TEST: ICD-10-CM

## 2023-02-08 DIAGNOSIS — E55.9 VITAMIN D DEFICIENCY: ICD-10-CM

## 2023-02-08 DIAGNOSIS — J30.89 NON-SEASONAL ALLERGIC RHINITIS, UNSPECIFIED TRIGGER: ICD-10-CM

## 2023-02-08 DIAGNOSIS — I10 ESSENTIAL HYPERTENSION, BENIGN: ICD-10-CM

## 2023-02-08 PROCEDURE — 3078F DIAST BP <80 MM HG: CPT | Performed by: STUDENT IN AN ORGANIZED HEALTH CARE EDUCATION/TRAINING PROGRAM

## 2023-02-08 PROCEDURE — 99001 SPECIMEN HANDLING PT-LAB: CPT

## 2023-02-08 PROCEDURE — 3074F SYST BP LT 130 MM HG: CPT | Performed by: STUDENT IN AN ORGANIZED HEALTH CARE EDUCATION/TRAINING PROGRAM

## 2023-02-08 PROCEDURE — 99214 OFFICE O/P EST MOD 30 MIN: CPT | Performed by: STUDENT IN AN ORGANIZED HEALTH CARE EDUCATION/TRAINING PROGRAM

## 2023-02-08 RX ORDER — DICLOFENAC SODIUM 10 MG/G
GEL TOPICAL
COMMUNITY

## 2023-02-08 RX ORDER — HYDROQUINONE 40 MG/G
CREAM TOPICAL
COMMUNITY
Start: 2022-09-21

## 2023-02-08 NOTE — PROGRESS NOTES
Osiris Hinojosa presents today for   Chief Complaint   Patient presents with    Follow-up     New to provider        Is someone accompanying this pt? No    Is the patient using any DME equipment during OV? No     Depression Screening:  3 most recent PHQ Screens 2/8/2023   Little interest or pleasure in doing things Not at all   Feeling down, depressed, irritable, or hopeless Not at all   Total Score PHQ 2 0       Learning Assessment:  Learning Assessment 10/8/2015   PRIMARY LEARNER Patient   PRIMARY LANGUAGE ENGLISH   LEARNER PREFERENCE PRIMARY LISTENING   ANSWERED BY patient   RELATIONSHIP SELF       Fall Risk  No flowsheet data found. ADL  ADL Assessment 2/8/2023   Feeding yourself No Help Needed   Getting from bed to chair No Help Needed   Getting dressed No Help Needed   Bathing or showering No Help Needed   Walk across the room (includes cane/walker) No Help Needed   Using the telphone No Help Needed   Taking your medications No Help Needed   Preparing meals No Help Needed   Managing money (expenses/bills) No Help Needed   Moderately strenuous housework (laundry) No Help Needed   Shopping for personal items (toiletries/medicines) No Help Needed   Shopping for groceries No Help Needed   Driving No Help Needed   Climbing a flight of stairs No Help Needed   Getting to places beyond walking distances No Help Needed       Health Maintenance reviewed and discussed and ordered per Provider. Health Maintenance Due   Topic Date Due    Hepatitis C Screening  Never done    COVID-19 Vaccine (4 - Booster for Pfizer series) 05/18/2022    Flu Vaccine (1) 08/01/2022   . Coordination of Care:  1. \"Have you been to the ER, urgent care clinic since your last visit? Hospitalized since your last visit? \" No    2. \"Have you seen or consulted any other health care providers outside of the [address] [place] [address] since your last visit? \" No     3. For patients aged 39-70: Has the patient had a colonoscopy?  Yes - no Care Gap present     If the patient is female:    4. For patients aged 41-77: Has the patient had a mammogram within the past 2 years? Yes - no Care Gap present    5. For patients aged 21-65: Has the patient had a pap smear?  Had hysterectomy

## 2023-02-08 NOTE — PROGRESS NOTES
Subjective:   Vishal Khan is a 58 y.o. female who was seen for Follow-up (New to provider )    Patient here to establish care with new physician. Has no acute concerns today. She only takes the escitalopram as needed when she gets a little bit anxious. I also reviewed all her other medications and vitamin supplements. All questions answered to her satisfaction. She is not having any shortness of breath, chest pain, headaches, vision changes, dizziness. Patient does note that when she checks her sugar in the morning its been running around the 110s. Which is unusual for her. Home Medications    Medication Sig Start Date End Date Taking? Authorizing Provider   diclofenac (VOLTAREN) 1 % gel diclofenac 1 % topical gel   APPLY 2 GRAMS TO THE AFFECTED AREA(S) BY TOPICAL ROUTE 4 TIMES PER DAY   Yes Provider, Historical   hydroquinone (ESOTERICA, MELQUIN) 4 % topical cream hydroquinone 4 % topical cream   APPLY TO AFFECTED AREA ONCE A  DAY FOR 30 DAYS 9/21/22  Yes Provider, Historical   fluticasone propionate (Flonase Allergy Relief) 50 mcg/actuation nasal spray two sprays in each nostril once a day for the first week. After that, use one or two sprays in each nostril once a day as needed. 10/3/22  Yes Rosa Cesar NP-C   lisinopril-hydroCHLOROthiazide (PRINZIDE, ZESTORETIC) 20-25 mg per tablet Take 1 Tablet by mouth daily. 10/3/22  Yes Rosa Cesar NP-C   escitalopram oxalate (Lexapro) 5 mg tablet Take 1 Tablet by mouth daily. 9/26/22  Yes Rosa Cesar NP-C   Nystop powder APPLY 1 POWDER TOPICALLY TWICE DAILY 5/25/22  Yes Provider, Historical   TRIAMCINOLONE ACETON-SILICONES EX by Apply Externally route. Yes Other, MD Benita   peg 400-propylene glycol (SYSTANE) 0.4-0.3 % drop as needed. Yes Other, MD Benita   cyanocobalamin 1,000 mcg tablet Take  by mouth daily. Yes Yandy, MD Benita   cholecalciferol (VITAMIN D3) 25 mcg (1,000 unit) cap Take  by mouth.    Yes Benita Kebede MD multivitamin (ONE A DAY) tablet Take 1 Tab by mouth daily. Yes Other, MD Benita   omeprazole (PRILOSEC) 20 mg capsule Take 1 Capsule by mouth in the morning. Patient not taking: Reported on 2/8/2023 8/11/22   Rosa Cesar NP-C      Allergies   Allergen Reactions    Latex Itching    Latex, Natural Rubber Itching    Tramadol Nausea and Vomiting and Nausea Only     Other reaction(s): gi distress  Other reaction(s): Nausea, Vommiting  Other reaction(s): gi distress      Loratadine Vertigo    Other Plant, Animal, Environmental Other (comments)     Dust - sneezing, runny nose  Cleaning Products (Pine Sol) - eye pain    Ultram [Tramadol] Nausea Only     Social History     Tobacco Use    Smoking status: Never    Smokeless tobacco: Never   Vaping Use    Vaping Use: Never used   Substance Use Topics    Alcohol use: No     Alcohol/week: 0.0 standard drinks    Drug use: No        Review of Systems   As noted above in HPI. Objective:   Visit Vitals  /66   Pulse 75   Temp 97.6 °F (36.4 °C) (Temporal)   Ht 5' 4\" (1.626 m)   Wt 164 lb 8 oz (74.6 kg)   SpO2 96%   BMI 28.24 kg/m²        General: alert, oriented, not in distress  Chest/Lungs: clear breath sounds, no wheezing or crackles  Heart: normal rate, regular rhythm, no murmur  Extremities: no focal deformities, no edema  Skin: no active skin lesions      Assessment & Plan:     1. Generalized anxiety disorder  Stable. Counseled that escitalopram should be taking daily to prevent anxiety attacks rather than as needed as it does not really help as an as needed medication. Patient understands and will start taking it every day. Continue escitalopram 5 mg daily    2. Essential hypertension, benign  Controlled. Continue lisinopril-hydrochlorothiazide 20-25 mg daily. Also recommend DASH diet and getting at least 30 minutes of exercise a day    3. Non-seasonal allergic rhinitis, unspecified trigger  Stable. Continue Flonase nasal spray daily    4.  Localized osteoarthritis of left knee  Stable. Uses diclofenac gel for this and is about to start physical therapy for it as well    5. Hyperglycemia  Patient reports that she has been checking her sugars in the morning and has noted that they are in the 110s. Counseled that this is abnormal and we will check hemoglobin A1c to screen for diabetes. This likely indicates she is prediabetic and should work on cutting back on carbs. - HEMOGLOBIN A1C WITH EAG; Future    6. Need for hepatitis C screening test  - HEPATITIS C AB; Future    7. Vitamin D deficiency  Stable. Continue vitamin D supplementation            I have discussed the diagnosis with the patient and the intended plan as seen in the above orders. The patient has received an after-visit summary and questions were answered concerning future plans. I have discussed medication side effects and warnings with the patient as well. I have reviewed the plan of care with the patient, accepted their input and they are in agreement with the treatment goals. Previous lab and imaging results were reviewed by me.        Venkata Ruiz MD  February 8, 2023

## 2023-02-09 ENCOUNTER — TELEPHONE (OUTPATIENT)
Dept: FAMILY MEDICINE CLINIC | Age: 63
End: 2023-02-09

## 2023-02-09 LAB
EST. AVERAGE GLUCOSE BLD GHB EST-MCNC: 114 MG/DL
HBA1C MFR BLD: 5.6 % (ref 4.8–5.6)
HCV AB S/CO SERPL IA: <0.1 S/CO RATIO (ref 0–0.9)
SPECIMEN STATUS REPORT, ROLRST: NORMAL

## 2023-02-09 NOTE — TELEPHONE ENCOUNTER
Called patient to speak about message from Nimo Eckert MD. No answer from telephone call. Left a voicemail for patient to call the office back.

## 2023-05-05 ENCOUNTER — OFFICE VISIT (OUTPATIENT)
Facility: CLINIC | Age: 63
End: 2023-05-05
Payer: MEDICAID

## 2023-05-05 VITALS
TEMPERATURE: 97.7 F | OXYGEN SATURATION: 96 % | DIASTOLIC BLOOD PRESSURE: 72 MMHG | SYSTOLIC BLOOD PRESSURE: 113 MMHG | HEART RATE: 65 BPM | BODY MASS INDEX: 28.05 KG/M2 | HEIGHT: 64 IN | WEIGHT: 164.3 LBS

## 2023-05-05 DIAGNOSIS — R00.2 PALPITATIONS: Primary | ICD-10-CM

## 2023-05-05 PROCEDURE — 3078F DIAST BP <80 MM HG: CPT | Performed by: STUDENT IN AN ORGANIZED HEALTH CARE EDUCATION/TRAINING PROGRAM

## 2023-05-05 PROCEDURE — 93000 ELECTROCARDIOGRAM COMPLETE: CPT | Performed by: STUDENT IN AN ORGANIZED HEALTH CARE EDUCATION/TRAINING PROGRAM

## 2023-05-05 PROCEDURE — 3074F SYST BP LT 130 MM HG: CPT | Performed by: STUDENT IN AN ORGANIZED HEALTH CARE EDUCATION/TRAINING PROGRAM

## 2023-05-05 PROCEDURE — 99214 OFFICE O/P EST MOD 30 MIN: CPT | Performed by: STUDENT IN AN ORGANIZED HEALTH CARE EDUCATION/TRAINING PROGRAM

## 2023-05-05 SDOH — ECONOMIC STABILITY: INCOME INSECURITY: HOW HARD IS IT FOR YOU TO PAY FOR THE VERY BASICS LIKE FOOD, HOUSING, MEDICAL CARE, AND HEATING?: SOMEWHAT HARD

## 2023-05-05 SDOH — ECONOMIC STABILITY: FOOD INSECURITY: WITHIN THE PAST 12 MONTHS, YOU WORRIED THAT YOUR FOOD WOULD RUN OUT BEFORE YOU GOT MONEY TO BUY MORE.: SOMETIMES TRUE

## 2023-05-05 SDOH — ECONOMIC STABILITY: HOUSING INSECURITY
IN THE LAST 12 MONTHS, WAS THERE A TIME WHEN YOU DID NOT HAVE A STEADY PLACE TO SLEEP OR SLEPT IN A SHELTER (INCLUDING NOW)?: NO

## 2023-05-05 SDOH — ECONOMIC STABILITY: FOOD INSECURITY: WITHIN THE PAST 12 MONTHS, THE FOOD YOU BOUGHT JUST DIDN'T LAST AND YOU DIDN'T HAVE MONEY TO GET MORE.: NEVER TRUE

## 2023-05-05 ASSESSMENT — PATIENT HEALTH QUESTIONNAIRE - PHQ9
SUM OF ALL RESPONSES TO PHQ9 QUESTIONS 1 & 2: 0
SUM OF ALL RESPONSES TO PHQ QUESTIONS 1-9: 0
1. LITTLE INTEREST OR PLEASURE IN DOING THINGS: 0
SUM OF ALL RESPONSES TO PHQ QUESTIONS 1-9: 0
2. FEELING DOWN, DEPRESSED OR HOPELESS: 0

## 2023-05-05 NOTE — PROGRESS NOTES
Subjective:   Herb Aggarwal is a 58 y.o. female who was seen for Palpitations (X1 week/)    Patient reports she has had palpitations for about a week. They occur daily sometimes multiple times a day. Reports they last for couple seconds at a time. No chest pain or shortness of breath. Prior to Admission medications    Medication Sig Start Date End Date Taking? Authorizing Provider   Cholecalciferol (VITAMIN D) 10 MCG (400 UNIT) CAPS Capsule Take by mouth   Yes Ar Automatic Reconciliation   cyanocobalamin 1000 MCG tablet Take by mouth daily   Yes Ar Automatic Reconciliation   escitalopram (LEXAPRO) 5 MG tablet Take 1 tablet by mouth daily 9/26/22  Yes Ar Automatic Reconciliation   fluticasone (FLONASE) 50 MCG/ACT nasal spray two sprays in each nostril once a day for the first week. After that, use one or two sprays in each nostril once a day as needed. 10/3/22  Yes Ar Automatic Reconciliation   lisinopril-hydroCHLOROthiazide (PRINZIDE;ZESTORETIC) 20-25 MG per tablet Take 1 tablet by mouth daily 10/3/22  Yes Ar Automatic Reconciliation   nystatin (MYCOSTATIN) 058475 UNIT/GM powder APPLY 1 POWDER TOPICALLY TWICE DAILY 5/25/22  Yes Ar Automatic Reconciliation   polyethyl glycol-propyl glycol 0.4-0.3 % (SYSTANE) 0.4-0.3 % ophthalmic solution as needed   Yes Ar Automatic Reconciliation   diclofenac sodium (VOLTAREN) 1 % GEL APPLY 2 GRAMS TOPICALLY TO AFFECTED AREA 4 TIMES DAILY 4/3/23   Historical Provider, MD     Allergies   Allergen Reactions    Latex Itching    Tramadol Nausea And Vomiting and Nausea Only     Other reaction(s): gi distress      Loratadine Dizziness or Vertigo     Social History     Tobacco Use    Smoking status: Never    Smokeless tobacco: Never   Vaping Use    Vaping Use: Never used   Substance Use Topics    Alcohol use: No     Alcohol/week: 0.0 standard drinks    Drug use: No        Review of Systems   As noted above in HPI.       Objective:   /72   Pulse 65   Temp 97.7 °F

## 2023-05-05 NOTE — PROGRESS NOTES
Maggie Rivera presents today for   Chief Complaint   Patient presents with    Palpitations     X1 week         Is someone accompanying this pt? No    Is the patient using any DME equipment during OV? No     Health Maintenance reviewed and discussed and ordered per Provider. Health Maintenance Due   Topic Date Due    HIV screen  Never done    DTaP/Tdap/Td vaccine (1 - Tdap) Never done    Shingles vaccine (1 of 2) Never done    COVID-19 Vaccine (4 - Booster for Pfizer series) 05/18/2022   . Coordination of Care:  1. \"Have you been to the ER, urgent care clinic since your last visit? Hospitalized since your last visit? \" No    2. \"Have you seen or consulted any other health care providers outside of the [address] [place] [address] since your last visit? \" No    3. For patients aged 39-70: Has the patient had a colonoscopy? Yes- No care gap present    If the patient is female:    4. For patients aged 41-77: Has the patient had a mammogram within the past 2 years? Yes- No care gap present    5. For patients aged 21-65: Has the patient had a pap smear?  No had a hysterectomy

## 2023-06-22 ENCOUNTER — TELEPHONE (OUTPATIENT)
Facility: HOSPITAL | Age: 63
End: 2023-06-22

## 2023-06-22 NOTE — TELEPHONE ENCOUNTER
Patient Cancel > 24 HRS Pt called 6/21/2023 at 8:24 pm to CX her appt and wished to reschedule. No reason given.

## 2023-06-26 ENCOUNTER — HOSPITAL ENCOUNTER (OUTPATIENT)
Age: 63
Discharge: HOME | End: 2023-06-28
Payer: MEDICAID

## 2023-06-26 VITALS
DIASTOLIC BLOOD PRESSURE: 64 MMHG | HEIGHT: 64 IN | WEIGHT: 164 LBS | BODY MASS INDEX: 28 KG/M2 | SYSTOLIC BLOOD PRESSURE: 134 MMHG

## 2023-06-26 DIAGNOSIS — R06.02 SHORTNESS OF BREATH: ICD-10-CM

## 2023-06-26 LAB
ECHO AO ASC DIAM: 2.8 CM
ECHO AO ASCENDING AORTA INDEX: 1.56 CM/M2
ECHO AO ROOT DIAM: 3 CM
ECHO AO ROOT INDEX: 1.67 CM/M2
ECHO AV AREA PEAK VELOCITY: 2.5 CM2
ECHO AV AREA VTI: 2.6 CM2
ECHO AV AREA/BSA PEAK VELOCITY: 1.4 CM2/M2
ECHO AV AREA/BSA VTI: 1.4 CM2/M2
ECHO AV MEAN GRADIENT: 5 MMHG
ECHO AV MEAN VELOCITY: 1 M/S
ECHO AV PEAK GRADIENT: 9 MMHG
ECHO AV PEAK VELOCITY: 1.5 M/S
ECHO AV VELOCITY RATIO: 0.8
ECHO AV VTI: 34.2 CM
ECHO BSA: 1.83 M2
ECHO EST RA PRESSURE: 3 MMHG
ECHO LA DIAMETER INDEX: 1.78 CM/M2
ECHO LA DIAMETER: 3.2 CM
ECHO LA TO AORTIC ROOT RATIO: 1.07
ECHO LA VOL 2C: 55 ML (ref 22–52)
ECHO LA VOL 4C: 36 ML (ref 22–52)
ECHO LA VOL BP: 44 ML (ref 22–52)
ECHO LA VOL/BSA BIPLANE: 24 ML/M2 (ref 16–34)
ECHO LA VOLUME AREA LENGTH: 50 ML
ECHO LA VOLUME INDEX A2C: 31 ML/M2 (ref 16–34)
ECHO LA VOLUME INDEX A4C: 20 ML/M2 (ref 16–34)
ECHO LA VOLUME INDEX AREA LENGTH: 28 ML/M2 (ref 16–34)
ECHO LV E' LATERAL VELOCITY: 9 CM/S
ECHO LV E' SEPTAL VELOCITY: 9 CM/S
ECHO LV EJECTION FRACTION A2C: 65 %
ECHO LV EJECTION FRACTION A4C: 70 %
ECHO LV EJECTION FRACTION BIPLANE: 69 % (ref 55–100)
ECHO LV FRACTIONAL SHORTENING: 42 % (ref 28–44)
ECHO LV GLOBAL LONGITUDINAL STRAIN (GLS): -20.9 %
ECHO LV INTERNAL DIMENSION DIASTOLE INDEX: 2.39 CM/M2
ECHO LV INTERNAL DIMENSION DIASTOLIC: 4.3 CM (ref 3.9–5.3)
ECHO LV INTERNAL DIMENSION SYSTOLIC INDEX: 1.39 CM/M2
ECHO LV INTERNAL DIMENSION SYSTOLIC: 2.5 CM
ECHO LV IVSD: 1 CM (ref 0.6–0.9)
ECHO LV MASS 2D: 142.5 G (ref 67–162)
ECHO LV MASS INDEX 2D: 79.2 G/M2 (ref 43–95)
ECHO LV POSTERIOR WALL DIASTOLIC: 1 CM (ref 0.6–0.9)
ECHO LV RELATIVE WALL THICKNESS RATIO: 0.47
ECHO LVOT AREA: 3.1 CM2
ECHO LVOT AV VTI INDEX: 0.83
ECHO LVOT DIAM: 2 CM
ECHO LVOT MEAN GRADIENT: 3 MMHG
ECHO LVOT PEAK GRADIENT: 6 MMHG
ECHO LVOT PEAK VELOCITY: 1.2 M/S
ECHO LVOT STROKE VOLUME INDEX: 49.7 ML/M2
ECHO LVOT SV: 89.5 ML
ECHO LVOT VTI: 28.5 CM
ECHO MAIN PULMONARY ARTERY DIAMETER: 1.8 CM
ECHO MV A VELOCITY: 0.73 M/S
ECHO MV AREA VTI: 3 CM2
ECHO MV E DECELERATION TIME (DT): 193.5 MS
ECHO MV E VELOCITY: 0.93 M/S
ECHO MV E/A RATIO: 1.27
ECHO MV E/E' LATERAL: 10.33
ECHO MV E/E' RATIO (AVERAGED): 10.33
ECHO MV E/E' SEPTAL: 10.33
ECHO MV LVOT VTI INDEX: 1.03
ECHO MV MAX VELOCITY: 1.3 M/S
ECHO MV MEAN GRADIENT: 2 MMHG
ECHO MV MEAN VELOCITY: 0.7 M/S
ECHO MV PEAK GRADIENT: 6 MMHG
ECHO MV VTI: 29.4 CM
ECHO PULMONARY ARTERY END DIASTOLIC PRESSURE: 2 MMHG
ECHO PV MAX VELOCITY: 0.8 M/S
ECHO PV MEAN GRADIENT: 2 MMHG
ECHO PV MEAN VELOCITY: 0.6 M/S
ECHO PV PEAK GRADIENT: 3 MMHG
ECHO PV REGURGITANT MAX VELOCITY: 0.7 M/S
ECHO RA END SYSTOLIC VOLUME APICAL 4 CHAMBER INDEX BSA: 18 ML/M2
ECHO RA VOLUME BIPLANE METHOD OF DISKS: 33 ML
ECHO RA VOLUME INDEX BP: 18 ML/M2
ECHO RA VOLUME: 33 ML
ECHO RIGHT VENTRICULAR SYSTOLIC PRESSURE (RVSP): 26 MMHG
ECHO RV INTERNAL DIMENSION: 2.7 CM
ECHO RV TAPSE: 2.2 CM (ref 1.7–?)
ECHO TV REGURGITANT MAX VELOCITY: 2.4 M/S
ECHO TV REGURGITANT PEAK GRADIENT: 23 MMHG

## 2023-06-26 PROCEDURE — 93306 TTE W/DOPPLER COMPLETE: CPT

## 2023-07-06 RX ORDER — FLUTICASONE PROPIONATE 50 MCG
SPRAY, SUSPENSION (ML) NASAL
Qty: 16 G | Refills: 3 | Status: SHIPPED | OUTPATIENT
Start: 2023-07-06

## 2023-07-25 ENCOUNTER — HOSPITAL ENCOUNTER (OUTPATIENT)
Facility: HOSPITAL | Age: 63
Setting detail: RECURRING SERIES
Discharge: HOME | End: 2023-07-28
Payer: MEDICAID

## 2023-07-25 PROCEDURE — 97161 PT EVAL LOW COMPLEX 20 MIN: CPT | Performed by: PHYSICAL THERAPIST

## 2023-07-25 NOTE — THERAPY EVALUATION
[address] PHYSICAL THERAPY  [address] [address] Ph: [phone] Fx: [phone]  Plan of Care / Statement of Necessity for Physical Therapy Services     Patient Name: Merle Fournier : 1960   Medical   Diagnosis: Left foot pain [M79.672]  Right foot pain [M79.671] Treatment Diagnosis:   M79.671  RIGHT FOOT PAIN and M79.672  LEFT FOOT PAIN     Onset Date: Chronic     Referral Source: Kristal Comment* Start of Care [facility]): 2023   Prior Hospitalization: See medical history Provider #: [id number]   Prior Level of Function: Household chores, Driving her sister. Independent. Comorbidities: Anxiety/Panic Disorders, Arthritis, Back pain, HBP, Kidney/Bladder/Urination Problems, Visual Impairment. Assessment / key information:  Patient with signs and symptoms consistent with bilateral foot pain. She has a hammer toe on her right foot and bunion on the left foot. States she was told she would benefit from orthotics. Patient demonstrates no gait deviation at normal kimi. She has mild tenderness to palpation in the left foot. She has good AROM and strength in both feet. Functionally, she has not significant limitations, states she will sit down for a while if her feet become painful. Patient will benefit from a program of skilled physical therapy to include therapeutic exercises to address strength deficits, therapeutic activities to improve functional mobility, neuromuscular reeducation to address balance, coordination and proprioception, manual therapy to address ROM and tissue extensibility and modalities as indicated. All questions were answered.       Evaluation Complexity:  History:  MEDIUM  Complexity : 1-2 comorbidities / personal factors will impact the outcome/ POC ; Examination:  MEDIUM Complexity : 3 Standardized tests and measures addressin body structure, function, activity limitation and / or participation in
pain level will be 0-3/10 with activity in order to improve patient's ability to perform normal ADLs. Evaluation:  Right 1/10-6/10. Left 0/10-10/10.  2. Patient will demonstrate  4+/5 strength on MMT for right and left inversion and eversion  Evaluation:  Right and left inversion and eversion 4/5.  3. Patient will increase FOTO score to 66 to indicate increased functional activity level. Evaluation:  60  4. Patient will report little to no difficulty walking 1 mile in order to improve her functional mobility. Evaluation:  Notes moderate difficulty.     PLAN  yes Continue plan of care  [x]  Upgrade activities as tolerated  []  Discharge due to :  []  Other:    Geraldine Hdez, PT    7/25/2023    7:30 AM    Future Appointments   Date Time Provider [address]   7/25/2023 10:20 AM Geraldine Hdez, PT [facility]   7/27/2023  9:30 AM Mey Villegas MD [facility] BS AMB   8/9/2023 10:30 AM Mey Villegas MD [facility] BS AMB

## 2023-07-31 ENCOUNTER — OFFICE VISIT (OUTPATIENT)
Facility: CLINIC | Age: 63
End: 2023-07-31
Payer: MEDICAID

## 2023-07-31 ENCOUNTER — HOSPITAL ENCOUNTER (OUTPATIENT)
Age: 63
Discharge: HOME | End: 2023-08-03

## 2023-07-31 ENCOUNTER — HOSPITAL ENCOUNTER (OUTPATIENT)
Age: 63
Discharge: HOME | End: 2023-08-03
Payer: MEDICAID

## 2023-07-31 VITALS
HEIGHT: 64 IN | HEART RATE: 67 BPM | WEIGHT: 162.1 LBS | TEMPERATURE: 97.9 F | SYSTOLIC BLOOD PRESSURE: 110 MMHG | DIASTOLIC BLOOD PRESSURE: 60 MMHG | BODY MASS INDEX: 27.68 KG/M2 | RESPIRATION RATE: 18 BRPM | OXYGEN SATURATION: 97 %

## 2023-07-31 DIAGNOSIS — M20.10 VALGUS DEFORMITY OF GREAT TOE, UNSPECIFIED LATERALITY: ICD-10-CM

## 2023-07-31 DIAGNOSIS — I10 ESSENTIAL HYPERTENSION, BENIGN: ICD-10-CM

## 2023-07-31 DIAGNOSIS — Z01.818 PRE-OPERATIVE CLEARANCE: ICD-10-CM

## 2023-07-31 DIAGNOSIS — Z11.3 ROUTINE SCREENING FOR STI (SEXUALLY TRANSMITTED INFECTION): ICD-10-CM

## 2023-07-31 DIAGNOSIS — Z01.818 PRE-OPERATIVE CLEARANCE: Primary | ICD-10-CM

## 2023-07-31 DIAGNOSIS — M20.40 HAMMER TOE, UNSPECIFIED LATERALITY: ICD-10-CM

## 2023-07-31 DIAGNOSIS — E78.2 MIXED HYPERLIPIDEMIA: ICD-10-CM

## 2023-07-31 LAB
EKG ATRIAL RATE: 65 BPM
EKG DIAGNOSIS: NORMAL
EKG P AXIS: 60 DEGREES
EKG P-R INTERVAL: 126 MS
EKG Q-T INTERVAL: 380 MS
EKG QRS DURATION: 72 MS
EKG QTC CALCULATION (BAZETT): 395 MS
EKG R AXIS: 54 DEGREES
EKG T AXIS: -11 DEGREES
EKG VENTRICULAR RATE: 65 BPM
LABCORP DRAW FEE: NORMAL

## 2023-07-31 PROCEDURE — 93005 ELECTROCARDIOGRAM TRACING: CPT | Performed by: STUDENT IN AN ORGANIZED HEALTH CARE EDUCATION/TRAINING PROGRAM

## 2023-07-31 PROCEDURE — 3074F SYST BP LT 130 MM HG: CPT | Performed by: STUDENT IN AN ORGANIZED HEALTH CARE EDUCATION/TRAINING PROGRAM

## 2023-07-31 PROCEDURE — 99214 OFFICE O/P EST MOD 30 MIN: CPT | Performed by: STUDENT IN AN ORGANIZED HEALTH CARE EDUCATION/TRAINING PROGRAM

## 2023-07-31 PROCEDURE — 3078F DIAST BP <80 MM HG: CPT | Performed by: STUDENT IN AN ORGANIZED HEALTH CARE EDUCATION/TRAINING PROGRAM

## 2023-07-31 ASSESSMENT — PATIENT HEALTH QUESTIONNAIRE - PHQ9
SUM OF ALL RESPONSES TO PHQ9 QUESTIONS 1 & 2: 0
1. LITTLE INTEREST OR PLEASURE IN DOING THINGS: 0
2. FEELING DOWN, DEPRESSED OR HOPELESS: 0
SUM OF ALL RESPONSES TO PHQ QUESTIONS 1-9: 0

## 2023-07-31 NOTE — PROGRESS NOTES
Ang Chacko presents today for   Chief Complaint   Patient presents with    Pre-op Exam     Pre-op exam for hammer toe surgery on 8/15/2023       Is someone accompanying this pt? no    Is the patient using any DME equipment during OV? no    Health Maintenance reviewed and discussed and ordered per Provider. Health Maintenance Due   Topic Date Due    HIV screen  Never done    DTaP/Tdap/Td vaccine (1 - Tdap) Never done    Shingles vaccine (1 of 2) Never done    COVID-19 Vaccine (4 - Booster for Pfizer series) 05/18/2022   . Coordination of Care:  1. \"Have you been to the ER, urgent care clinic since your last visit? Hospitalized since your last visit? \" No    2. \"Have you seen or consulted any other health care providers outside of the [address] [place] [address] since your last visit? \" No    3. For patients aged 43-73: Has the patient had a colonoscopy? Yes- No care gap present    If the patient is female:    4. For patients aged 43-66: Has the patient had a mammogram within the past 2 years? Yes- No care gap present    5. For patients aged 21-65: Has the patient had a pap smear?  Yes- No care gap present

## 2023-07-31 NOTE — PROGRESS NOTES
Subjective:   Mariel Boucher is a 58 y.o. female who was seen for Pre-op Exam (Pre-op exam for hammer toe surgery on 8/15/2023)    Here for pre-op clearance for modified Meng bunionectomy of left foot, hammertoe repair of right second digit by Dr. Svetlana Martel under [address] anesthesia on 8/21/23. No history of issues with anesthesia in the past.    Prior to Admission medications    Medication Sig Start Date End Date Taking? Authorizing Provider   fluticasone (FLONASE) 50 MCG/ACT nasal spray two sprays in each nostril once a day for the first week. After that, use one or two sprays in each nostril once a day as needed.  7/6/23  Yes Soha Bhatti MD   diclofenac sodium (VOLTAREN) 1 % GEL APPLY 2 GRAMS TOPICALLY TO AFFECTED AREA 4 TIMES DAILY 4/3/23  Yes Historical Provider, MD   Cholecalciferol (VITAMIN D) 10 MCG (400 UNIT) CAPS Capsule Take by mouth   Yes Ar Automatic Reconciliation   cyanocobalamin 1000 MCG tablet Take by mouth daily   Yes Ar Automatic Reconciliation   escitalopram (LEXAPRO) 5 MG tablet Take 1 tablet by mouth daily 9/26/22  Yes Ar Automatic Reconciliation   lisinopril-hydroCHLOROthiazide (PRINZIDE;ZESTORETIC) 20-25 MG per tablet Take 1 tablet by mouth daily 10/3/22  Yes Ar Automatic Reconciliation   nystatin (MYCOSTATIN) 086208 UNIT/GM powder APPLY 1 POWDER TOPICALLY TWICE DAILY 5/25/22  Yes Ar Automatic Reconciliation   polyethyl glycol-propyl glycol 0.4-0.3 % (SYSTANE) 0.4-0.3 % ophthalmic solution as needed   Yes Ar Automatic Reconciliation     Allergies   Allergen Reactions    Latex Itching    Tramadol Nausea And Vomiting and Nausea Only     Other reaction(s): gi distress      Loratadine Dizziness or Vertigo     Social History     Tobacco Use    Smoking status: Never    Smokeless tobacco: Never   Vaping Use    Vaping Use: Never used   Substance Use Topics    Alcohol use: No     Alcohol/week: 0.0 standard drinks    Drug use: No        Review of Systems   As noted above in

## 2023-08-01 LAB
ALBUMIN SERPL-MCNC: 4.5 G/DL (ref 3.9–4.9)
ALBUMIN/GLOB SERPL: 1.9 {RATIO} (ref 1.2–2.2)
ALP SERPL-CCNC: 64 IU/L (ref 44–121)
ALT SERPL-CCNC: 22 IU/L (ref 0–32)
AST SERPL-CCNC: 24 IU/L (ref 0–40)
BILIRUB SERPL-MCNC: 0.3 MG/DL (ref 0–1.2)
BUN SERPL-MCNC: 14 MG/DL (ref 8–27)
BUN/CREAT SERPL: 16 (ref 12–28)
CALCIUM SERPL-MCNC: 10.1 MG/DL (ref 8.7–10.3)
CHLORIDE SERPL-SCNC: 100 MMOL/L (ref 96–106)
CHOLEST SERPL-MCNC: 250 MG/DL (ref 100–199)
CO2 SERPL-SCNC: 29 MMOL/L (ref 20–29)
CREAT SERPL-MCNC: 0.86 MG/DL (ref 0.57–1)
EGFRCR SERPLBLD CKD-EPI 2021: 75 ML/MIN/1.73
ERYTHROCYTE [DISTWIDTH] IN BLOOD BY AUTOMATED COUNT: 11.9 % (ref 11.7–15.4)
GLOBULIN SER CALC-MCNC: 2.4 G/DL (ref 1.5–4.5)
GLUCOSE SERPL-MCNC: 85 MG/DL (ref 70–99)
HCT VFR BLD AUTO: 36.1 % (ref 34–46.6)
HDLC SERPL-MCNC: 96 MG/DL
HGB BLD-MCNC: 12.1 G/DL (ref 11.1–15.9)
HIV 1+2 AB+HIV1 P24 AG SERPL QL IA: NON REACTIVE
LDLC SERPL CALC-MCNC: 143 MG/DL (ref 0–99)
MCH RBC QN AUTO: 30.4 PG (ref 26.6–33)
MCHC RBC AUTO-ENTMCNC: 33.5 G/DL (ref 31.5–35.7)
MCV RBC AUTO: 91 FL (ref 79–97)
PLATELET # BLD AUTO: 209 X10E3/UL (ref 150–450)
POTASSIUM SERPL-SCNC: 4.1 MMOL/L (ref 3.5–5.2)
PROT SERPL-MCNC: 6.9 G/DL (ref 6–8.5)
RBC # BLD AUTO: 3.98 X10E6/UL (ref 3.77–5.28)
SODIUM SERPL-SCNC: 140 MMOL/L (ref 134–144)
SPECIMEN STATUS REPORT: NORMAL
TRIGL SERPL-MCNC: 69 MG/DL (ref 0–149)
VLDLC SERPL CALC-MCNC: 11 MG/DL (ref 5–40)
WBC # BLD AUTO: 5.8 X10E3/UL (ref 3.4–10.8)

## 2023-08-02 ENCOUNTER — HOSPITAL ENCOUNTER (OUTPATIENT)
Facility: HOSPITAL | Age: 63
Setting detail: RECURRING SERIES
Discharge: HOME | End: 2023-08-05
Payer: MEDICAID

## 2023-08-02 PROCEDURE — 97112 NEUROMUSCULAR REEDUCATION: CPT | Performed by: PHYSICAL THERAPIST

## 2023-08-02 PROCEDURE — 97110 THERAPEUTIC EXERCISES: CPT | Performed by: PHYSICAL THERAPIST

## 2023-08-02 PROCEDURE — 97530 THERAPEUTIC ACTIVITIES: CPT | Performed by: PHYSICAL THERAPIST

## 2023-08-02 NOTE — PROGRESS NOTES
Appointments   Date Time Provider [address]   8/2/2023 10:20 AM Abigail Penny, PT [facility]   8/4/2023 10:20 AM Brandon Rincon, PT [facility]   8/8/2023  9:40 AM Mariela Brand, PTA [facility]   8/9/2023 10:30 AM Ivon Maldonado MD [facility] BS AMB   8/10/2023  9:00 AM Mariela Brand, PTA [facility]   2/1/2024  9:45 AM Ivon Maldonado MD [facility] BS AMB

## 2023-08-04 ENCOUNTER — HOSPITAL ENCOUNTER (OUTPATIENT)
Facility: HOSPITAL | Age: 63
Setting detail: RECURRING SERIES
Discharge: HOME | End: 2023-08-07
Payer: MEDICAID

## 2023-08-04 PROCEDURE — 97110 THERAPEUTIC EXERCISES: CPT

## 2023-08-04 PROCEDURE — 97112 NEUROMUSCULAR REEDUCATION: CPT

## 2023-08-04 PROCEDURE — 97530 THERAPEUTIC ACTIVITIES: CPT

## 2023-08-04 NOTE — PROGRESS NOTES
PHYSICAL / OCCUPATIONAL THERAPY - DAILY TREATMENT NOTE (updated )    Patient Name: Ramandeep Lechuga    Date: 2023    : 1960  Insurance: Payor: Edyta Browning / Plan: Joanna Tran / Product Type: *No Product type* /      Patient  verified Yes     Visit #   Current / Total 3 8   Time   In / Out 10:12 10:54   Pain   In / Out Right 1  Left 3 Right 0  Left 4   Subjective Functional Status/Changes: Patient notes the pain is greater in her left foot. She reports last night she woke up due to left foot pain and had to rub cream on it in order to fall back asleep. TREATMENT AREA =  Left foot pain [M79.672]  Right foot pain [M79.671]    OBJECTIVE    Therapeutic Procedures: Tx Min Billable or 1:1 Min (if diff from Tx Min) Procedure, Rationale, Specifics   15  10498 Therapeutic Exercise (timed):  increase ROM, strength, coordination, balance, and proprioception to improve patient's ability to progress to PLOF and address remaining functional goals. (see flow sheet as applicable)     Details if applicable:       16  51832 Neuromuscular Re-Education (timed):  improve balance, coordination, kinesthetic sense, posture, core stability and proprioception to improve patient's ability to develop conscious control of individual muscles and awareness of position of extremities in order to progress to PLOF and address remaining functional goals. (see flow sheet as applicable)     Details if applicable:     11  73481 Therapeutic Activity (timed):  use of dynamic activities replicating functional movements to increase ROM, strength, coordination, balance, and proprioception in order to improve patient's ability to progress to PLOF and address remaining functional goals.   (see flow sheet as applicable)     Details if applicable:     43  [facility] Totals Reminder: bill using total billable min of TIMED therapeutic procedures (example: do not include dry needle or estim unattended, both

## 2023-08-07 ENCOUNTER — TELEPHONE (OUTPATIENT)
Facility: CLINIC | Age: 63
End: 2023-08-07

## 2023-08-07 NOTE — TELEPHONE ENCOUNTER
----- Message from Royce Carvalho MD sent at 8/2/2023  7:29 AM EDT -----  Labs normal, except cholesterol is elevated. We can discuss recommendations at your appointment on 8/9  ----- Message -----  From: Vida Davis Incoming Amb Ref Lab Orders To Nicole Stack: 8/1/2023   6:38 AM EDT  To: Royce Carvalho MD      Called patient to speak about message from Gael Price MD. Jose Alfredo Ireland to reach patient via telephone, left voicemail to call the office back. Will attempt to call again.   Patient comes in on 8/9/23

## 2023-08-08 ENCOUNTER — HOSPITAL ENCOUNTER (OUTPATIENT)
Facility: HOSPITAL | Age: 63
Setting detail: RECURRING SERIES
Discharge: HOME | End: 2023-08-11
Payer: MEDICAID

## 2023-08-08 PROCEDURE — 97112 NEUROMUSCULAR REEDUCATION: CPT

## 2023-08-08 PROCEDURE — 97530 THERAPEUTIC ACTIVITIES: CPT

## 2023-08-08 PROCEDURE — 97110 THERAPEUTIC EXERCISES: CPT

## 2023-08-09 ENCOUNTER — OFFICE VISIT (OUTPATIENT)
Facility: CLINIC | Age: 63
End: 2023-08-09
Payer: MEDICAID

## 2023-08-09 VITALS
DIASTOLIC BLOOD PRESSURE: 75 MMHG | SYSTOLIC BLOOD PRESSURE: 118 MMHG | HEART RATE: 74 BPM | HEIGHT: 64 IN | OXYGEN SATURATION: 98 % | BODY MASS INDEX: 27.62 KG/M2 | WEIGHT: 161.8 LBS | TEMPERATURE: 97.7 F

## 2023-08-09 DIAGNOSIS — E78.2 MIXED HYPERLIPIDEMIA: Primary | ICD-10-CM

## 2023-08-09 DIAGNOSIS — I10 ESSENTIAL HYPERTENSION, BENIGN: ICD-10-CM

## 2023-08-09 PROCEDURE — 3074F SYST BP LT 130 MM HG: CPT | Performed by: STUDENT IN AN ORGANIZED HEALTH CARE EDUCATION/TRAINING PROGRAM

## 2023-08-09 PROCEDURE — 3078F DIAST BP <80 MM HG: CPT | Performed by: STUDENT IN AN ORGANIZED HEALTH CARE EDUCATION/TRAINING PROGRAM

## 2023-08-09 PROCEDURE — 99214 OFFICE O/P EST MOD 30 MIN: CPT | Performed by: STUDENT IN AN ORGANIZED HEALTH CARE EDUCATION/TRAINING PROGRAM

## 2023-08-09 ASSESSMENT — PATIENT HEALTH QUESTIONNAIRE - PHQ9
SUM OF ALL RESPONSES TO PHQ QUESTIONS 1-9: 0
1. LITTLE INTEREST OR PLEASURE IN DOING THINGS: 0
SUM OF ALL RESPONSES TO PHQ QUESTIONS 1-9: 0
SUM OF ALL RESPONSES TO PHQ9 QUESTIONS 1 & 2: 0
2. FEELING DOWN, DEPRESSED OR HOPELESS: 0

## 2023-08-09 NOTE — PROGRESS NOTES
Subjective:   Geraldine Earl is a 58 y.o. female who was seen for 3 Month Follow-Up    Patient here for follow-up. She has been doing well. Planning on getting foot surgery the end of the month. She has been incorporating lifestyle modifications and eating healthier and has been trying to incorporate exercise. She is not due for any refills on any medications. She has blood work that we reviewed from last visit    Prior to Admission medications    Medication Sig Start Date End Date Taking? Authorizing Provider   fluticasone (FLONASE) 50 MCG/ACT nasal spray two sprays in each nostril once a day for the first week. After that, use one or two sprays in each nostril once a day as needed.  7/6/23  Yes Michel Owusu MD   diclofenac sodium (VOLTAREN) 1 % GEL APPLY 2 GRAMS TOPICALLY TO AFFECTED AREA 4 TIMES DAILY 4/3/23  Yes Historical Provider, MD   Cholecalciferol (VITAMIN D) 10 MCG (400 UNIT) CAPS Capsule Take by mouth   Yes Ar Automatic Reconciliation   cyanocobalamin 1000 MCG tablet Take by mouth daily   Yes Ar Automatic Reconciliation   escitalopram (LEXAPRO) 5 MG tablet Take 1 tablet by mouth daily 9/26/22  Yes Ar Automatic Reconciliation   lisinopril-hydroCHLOROthiazide (PRINZIDE;ZESTORETIC) 20-25 MG per tablet Take 1 tablet by mouth daily 10/3/22  Yes Ar Automatic Reconciliation   nystatin (MYCOSTATIN) 216140 UNIT/GM powder APPLY 1 POWDER TOPICALLY TWICE DAILY 5/25/22  Yes Ar Automatic Reconciliation   polyethyl glycol-propyl glycol 0.4-0.3 % (SYSTANE) 0.4-0.3 % ophthalmic solution as needed   Yes Ar Automatic Reconciliation     Allergies   Allergen Reactions    Latex Itching    Tramadol Nausea And Vomiting and Nausea Only     Other reaction(s): gi distress      Loratadine Dizziness or Vertigo     Social History     Tobacco Use    Smoking status: Never    Smokeless tobacco: Never   Vaping Use    Vaping Use: Never used   Substance Use Topics    Alcohol use: No     Alcohol/week: 0.0 standard drinks

## 2023-08-09 NOTE — PROGRESS NOTES
Carol Azar presents today for   Chief Complaint   Patient presents with    3 Month Follow-Up       Is someone accompanying this pt? No     Is the patient using any DME equipment during OV? No     Health Maintenance reviewed and discussed and ordered per Provider. Health Maintenance Due   Topic Date Due    DTaP/Tdap/Td vaccine (1 - Tdap) Never done    Shingles vaccine (1 of 2) Never done    COVID-19 Vaccine (4 - Booster for Pfizer series) 05/18/2022    Flu vaccine (1) 08/01/2023   . Coordination of Care:  1. \"Have you been to the ER, urgent care clinic since your last visit? Hospitalized since your last visit? \" Yes    2. \"Have you seen or consulted any other health care providers outside of the [address] [place] [address] since your last visit? \" No    3. For patients aged 43-73: Has the patient had a colonoscopy? Yes- No care gap present    If the patient is female:    4. For patients aged 43-66: Has the patient had a mammogram within the past 2 years? Yes- No care gap present    5. For patients aged 21-65: Has the patient had a pap smear?  Hysterectomy

## 2023-08-10 ENCOUNTER — HOSPITAL ENCOUNTER (OUTPATIENT)
Facility: HOSPITAL | Age: 63
Setting detail: RECURRING SERIES
Discharge: HOME | End: 2023-08-13
Payer: MEDICAID

## 2023-08-10 PROCEDURE — 97112 NEUROMUSCULAR REEDUCATION: CPT

## 2023-08-10 PROCEDURE — 97110 THERAPEUTIC EXERCISES: CPT

## 2023-08-10 PROCEDURE — 97530 THERAPEUTIC ACTIVITIES: CPT

## 2023-08-10 NOTE — PROGRESS NOTES
PHYSICAL / OCCUPATIONAL THERAPY - DAILY TREATMENT NOTE (updated )    Patient Name: Geraldine Earl    Date: 8/10/2023    : 1960  Insurance: Payor: Melissa Salas / Plan: Nicholas Uribe / Product Type: *No Product type* /      Patient  verified Yes     Visit #   Current / Total 5 8   Time   In / Out [id number]   Pain   In / Out Right 3; left 1 0   Subjective Functional Status/Changes: Patient states move of her pain comes in the afternoon. TREATMENT AREA =  Left foot pain [M79.672]  Right foot pain [M79.671]    OBJECTIVE    Modalities Rationale:     decrease pain to improve patient's ability to progress to PLOF and address remaining functional goals. 10 min  unbill [x]  Ice     []  Heat    location/position: Sitting; left foot   Skin assessment post-treatment (if applicable):    []  intact    []  redness- no adverse reaction                 []redness - adverse reaction:          Therapeutic Procedures: Tx Min Billable or 1:1 Min (if diff from Tx Min) Procedure, Rationale, Specifics   16   42921 Therapeutic Exercise (timed):  increase ROM, strength, coordination, balance, and proprioception to improve patient's ability to progress to PLOF and address remaining functional goals. (see flow sheet as applicable)      Details if applicable:        15   51805 Neuromuscular Re-Education (timed):  improve balance, coordination, kinesthetic sense, posture, core stability and proprioception to improve patient's ability to develop conscious control of individual muscles and awareness of position of extremities in order to progress to PLOF and address remaining functional goals.  (see flow sheet as applicable)      Details if applicable:     8   66320 Therapeutic Activity (timed):  use of dynamic activities replicating functional movements to increase ROM, strength, coordination, balance, and proprioception in order to improve patient's ability to progress to PLOF and

## 2023-08-14 ENCOUNTER — HOSPITAL ENCOUNTER (OUTPATIENT)
Facility: HOSPITAL | Age: 63
Setting detail: RECURRING SERIES
Discharge: HOME | End: 2023-08-17
Payer: MEDICAID

## 2023-08-14 PROCEDURE — 97112 NEUROMUSCULAR REEDUCATION: CPT

## 2023-08-14 PROCEDURE — 97530 THERAPEUTIC ACTIVITIES: CPT

## 2023-08-14 PROCEDURE — 97110 THERAPEUTIC EXERCISES: CPT

## 2023-08-14 NOTE — PROGRESS NOTES
PHYSICAL / OCCUPATIONAL THERAPY - DAILY TREATMENT NOTE (updated )    Patient Name: Geraldine Earl    Date: 2023    : 1960  Insurance: Payor: Melissa Salas / Plan: Nicholas Uribe / Product Type: *No Product type* /      Patient  verified Yes     Visit #   Current / Total 6 8   Time   In / Out 936 1016   Pain   In / Out Right 3; left 2 Left 5, right 0   Subjective Functional Status/Changes: Patient reports her pain foot pain increases around 10-15 minutes into walking. TREATMENT AREA =  Left foot pain [M79.672]  Right foot pain [M79.671]    OBJECTIVE       Therapeutic Procedures: Tx Min Billable or 1:1 Min (if diff from Tx Min) Procedure, Rationale, Specifics   16   10595 Therapeutic Exercise (timed):  increase ROM, strength, coordination, balance, and proprioception to improve patient's ability to progress to PLOF and address remaining functional goals. (see flow sheet as applicable)      Details if applicable:        15   62104 Neuromuscular Re-Education (timed):  improve balance, coordination, kinesthetic sense, posture, core stability and proprioception to improve patient's ability to develop conscious control of individual muscles and awareness of position of extremities in order to progress to PLOF and address remaining functional goals. (see flow sheet as applicable)      Details if applicable:     9   30443 Therapeutic Activity (timed):  use of dynamic activities replicating functional movements to increase ROM, strength, coordination, balance, and proprioception in order to improve patient's ability to progress to PLOF and address remaining functional goals.   (see flow sheet as applicable)      Details if applicable:     36   [facility] Totals Reminder: bill using total billable min of TIMED therapeutic procedures (example: do not include dry needle or estim unattended, both untimed codes, in totals to left)  8-22 min = 1 unit; 23-37 min = 2 units;

## 2023-08-17 ENCOUNTER — TELEPHONE (OUTPATIENT)
Facility: CLINIC | Age: 63
End: 2023-08-17

## 2023-08-17 DIAGNOSIS — Z12.31 SCREENING MAMMOGRAM FOR BREAST CANCER: Primary | ICD-10-CM

## 2023-08-17 NOTE — TELEPHONE ENCOUNTER
Patient calls to request a screening mammogram order. She says that she got a diagnostic mammogram and ultrasound last year but is not having any problems this year. Patient requests order for screening mammogram to be mailed to her home. She is due for a mammogram in October.

## 2023-08-18 ENCOUNTER — HOSPITAL ENCOUNTER (OUTPATIENT)
Facility: HOSPITAL | Age: 63
Setting detail: RECURRING SERIES
Discharge: HOME | End: 2023-08-21
Payer: MEDICAID

## 2023-08-18 PROCEDURE — 97530 THERAPEUTIC ACTIVITIES: CPT

## 2023-08-18 PROCEDURE — 97112 NEUROMUSCULAR REEDUCATION: CPT

## 2023-08-18 PROCEDURE — 97110 THERAPEUTIC EXERCISES: CPT

## 2023-08-18 NOTE — PROGRESS NOTES
[facility] PHYSICAL THERAPY  [address] Ph: [phone] Fx: [phone] [facility] SUMMARY  Patient Name: Mahendra Leary : 1960   Treatment/Medical Diagnosis: Left foot pain [M79.672]  Right foot pain [M79.671]   Referral Source: Minh Moses*     Date of Initial Visit: 2023 Attended Visits: 7 Missed Visits: 0     SUMMARY OF TREATMENT  Patient reports 98% improvement with overall symptoms. Patient reports she has increased her daily walking routine from 20 mins to 30 mins. Patient has improved in overall ankle strength. CURRENT STATUS     Short Term Goals: To be accomplished in 1 weeks  1. Patient will become proficient in their HEP and will be compliant in performing that program.  Evaluation:   Patient given a written/illustrated HEP. Current:  Patient reports daily compliance with her HEP.  2023. Goal Met. Long Term Goals: To be accomplished in 4 weeks  1. Patient's pain level will be 0-3/10 with activity in order to improve patient's ability to perform normal ADLs. Evaluation:  Right 1/10-6/10. Left 0/10-10/10. Current:progressing: pain range right 2/10-4/10; left 2/10-5/10. 23  2. Patient will demonstrate  4+/5 strength on MMT for right and left inversion and eversion  Evaluation:  Right and left inversion and eversion 4/5. Current: MET: B inversion and eversion MMT 4+/5.   3. Patient will increase FOTO score to 66 to indicate increased functional activity level. Evaluation:  60  Current: MET: FOTO = 68  4. Patient will report little to no difficulty walking 1 mile in order to improve her functional mobility. Evaluation:  Notes moderate difficulty. Current: remains: moderate difficulty with walking a mile. 23    RECOMMENDATIONS  Discontinue therapy. Progressing towards or have reached established goals. Patient also plans to have surgery on B feet.      If you have any
Physical Therapy Discharge Instructions      [facility]  [address] Ph: [phone] Fx: [phone]    Patient: Derrick Fajardo  : 1960      Continue Home Exercise Program 1 time per day      Continue with    [] Ice  as needed      [] Heat           Follow up with MD:     [] Upon completion of therapy     [] As needed      Recommendations:     []   Return to activity with home program    []   Return to activity with the following modifications:       []Post Rehab Program    []Join Independent aquatic program     []Return to/join local gym        KOMAL GONZALES, PTA 2023 10:01 AM
[address]   2/1/2024  9:45 AM Faizan Slater MD [facility] BS AMB

## 2023-12-28 ENCOUNTER — TELEPHONE (OUTPATIENT)
Age: 63
End: 2023-12-28

## 2023-12-28 NOTE — TELEPHONE ENCOUNTER
Patient called to be scheduled for problems that has resurfaced since last seen in office and procedures done in 2022, having problems with swallowing and feeling as if something is in throat

## 2023-12-28 NOTE — PROGRESS NOTES
EGD prep given to the patient via telephone and mailed. Procedure 1-9-2024, Dx Code: R13.10  Patient verbalized understanding.  scooter Galindo

## 2023-12-29 ENCOUNTER — SCHEDULED TELEPHONE ENCOUNTER (OUTPATIENT)
Age: 63
End: 2023-12-29

## 2023-12-29 DIAGNOSIS — R13.10 DYSPHAGIA, UNSPECIFIED TYPE: Primary | ICD-10-CM

## 2024-01-02 RX ORDER — M-VIT,TX,IRON,MINS/CALC/FOLIC 27MG-0.4MG
1 TABLET ORAL DAILY
COMMUNITY

## 2024-01-02 RX ORDER — LISINOPRIL 20 MG/1
20 TABLET ORAL DAILY
COMMUNITY

## 2024-01-02 RX ORDER — HYDROCHLOROTHIAZIDE 12.5 MG/1
12.5 TABLET ORAL DAILY
COMMUNITY

## 2024-01-03 ENCOUNTER — PREP FOR PROCEDURE (OUTPATIENT)
Age: 64
End: 2024-01-03

## 2024-01-03 DIAGNOSIS — R13.10 DYSPHAGIA, UNSPECIFIED TYPE: Primary | ICD-10-CM

## 2024-01-03 RX ORDER — SODIUM CHLORIDE, SODIUM LACTATE, POTASSIUM CHLORIDE, CALCIUM CHLORIDE 600; 310; 30; 20 MG/100ML; MG/100ML; MG/100ML; MG/100ML
INJECTION, SOLUTION INTRAVENOUS CONTINUOUS
Status: CANCELLED | OUTPATIENT
Start: 2024-01-03

## 2024-01-03 NOTE — H&P
Open access updated H&P.      Brief history: The patient is female Black /  64 y.o. who was referred for dysphagia to be evaluated by EGD.  Review of the records showed that they had few if any health problems, excessive obesity, or significant medications that would require office evaluation prior to EGD for dysphagia.  After review of their chart, contact was made with the patient in discussion and literature sent regarding the procedure and the bowel preparation.  They are here now for their procedure.    Past medical and surgical history:   Past Medical History:   Diagnosis Date    Arthritis     Carpal tunnel syndrome, left     Endocrine disease     Hypertension     Numbness and tingling in left hand       Past Surgical History:   Procedure Laterality Date    COLONOSCOPY N/A 7/19/2022    COLONOSCOPY performed by Sergio Hernández MD at [facility] ENDOSCOPY    GYN      HYSTERECTOMY (CERVIX STATUS UNKNOWN)  2001    HYSTERECTOMY (CERVIX STATUS UNKNOWN)          Allergies:    Allergies   Allergen Reactions    Latex Itching    Tramadol Nausea And Vomiting and Nausea Only     Other reaction(s): gi distress      Loratadine Dizziness or Vertigo        Medications:  No current facility-administered medications for this encounter.    Current Outpatient Medications:     Multiple Vitamins-Minerals (THERAPEUTIC MULTIVITAMIN-MINERALS) tablet, Take 1 tablet by mouth daily, Disp: , Rfl:     lisinopril (PRINIVIL;ZESTRIL) 20 MG tablet, Take 1 tablet by mouth daily, Disp: , Rfl:     hydroCHLOROthiazide (HYDRODIURIL) 12.5 MG tablet, Take 1 tablet by mouth daily, Disp: , Rfl:     fluticasone (FLONASE) 50 MCG/ACT nasal spray, two sprays in each nostril once a day for the first week. After that, use one or two sprays in each nostril once a day as needed., Disp: 16 g, Rfl: 3    diclofenac sodium (VOLTAREN) 1 % GEL, APPLY 2 GRAMS TOPICALLY TO AFFECTED AREA 4 TIMES DAILY, Disp: , Rfl:     Cholecalciferol (VITAMIN D) 10 MCG (400

## 2024-01-08 ENCOUNTER — ANESTHESIA EVENT (OUTPATIENT)
Age: 64
End: 2024-01-08
Payer: MEDICARE

## 2024-01-09 ENCOUNTER — ANESTHESIA (OUTPATIENT)
Age: 64
End: 2024-01-09
Payer: MEDICARE

## 2024-01-09 ENCOUNTER — APPOINTMENT (OUTPATIENT)
Age: 64
End: 2024-01-09
Attending: INTERNAL MEDICINE
Payer: MEDICARE

## 2024-01-09 ENCOUNTER — HOSPITAL ENCOUNTER (OUTPATIENT)
Age: 64
Setting detail: OUTPATIENT SURGERY
Discharge: HOME | End: 2024-01-09
Attending: INTERNAL MEDICINE | Admitting: INTERNAL MEDICINE
Payer: MEDICARE

## 2024-01-09 VITALS
SYSTOLIC BLOOD PRESSURE: 130 MMHG | DIASTOLIC BLOOD PRESSURE: 73 MMHG | HEIGHT: 64 IN | OXYGEN SATURATION: 100 % | WEIGHT: 162 LBS | HEART RATE: 68 BPM | TEMPERATURE: 97.9 F | BODY MASS INDEX: 27.66 KG/M2 | RESPIRATION RATE: 20 BRPM

## 2024-01-09 DIAGNOSIS — R13.10 DYSPHAGIA, UNSPECIFIED TYPE: ICD-10-CM

## 2024-01-09 PROCEDURE — 3600007512: Performed by: INTERNAL MEDICINE

## 2024-01-09 PROCEDURE — 6360000002 HC RX W HCPCS: Performed by: NURSE ANESTHETIST, CERTIFIED REGISTERED

## 2024-01-09 PROCEDURE — 3700000001 HC ADD 15 MINUTES (ANESTHESIA): Performed by: INTERNAL MEDICINE

## 2024-01-09 PROCEDURE — 7100000011 HC PHASE II RECOVERY - ADDTL 15 MIN: Performed by: INTERNAL MEDICINE

## 2024-01-09 PROCEDURE — 3600007502: Performed by: INTERNAL MEDICINE

## 2024-01-09 PROCEDURE — 43249 ESOPH EGD DILATION <30 MM: CPT | Performed by: INTERNAL MEDICINE

## 2024-01-09 PROCEDURE — C1726 CATH, BAL DIL, NON-VASCULAR: HCPCS | Performed by: INTERNAL MEDICINE

## 2024-01-09 PROCEDURE — 2709999900 HC NON-CHARGEABLE SUPPLY: Performed by: INTERNAL MEDICINE

## 2024-01-09 PROCEDURE — 2580000003 HC RX 258: Performed by: INTERNAL MEDICINE

## 2024-01-09 PROCEDURE — 3700000000 HC ANESTHESIA ATTENDED CARE: Performed by: INTERNAL MEDICINE

## 2024-01-09 PROCEDURE — C1713 ANCHOR/SCREW BN/BN,TIS/BN: HCPCS | Performed by: INTERNAL MEDICINE

## 2024-01-09 PROCEDURE — 7100000010 HC PHASE II RECOVERY - FIRST 15 MIN: Performed by: INTERNAL MEDICINE

## 2024-01-09 RX ORDER — PROPOFOL 10 MG/ML
INJECTION, EMULSION INTRAVENOUS PRN
Status: DISCONTINUED | OUTPATIENT
Start: 2024-01-09 | End: 2024-01-09 | Stop reason: SDUPTHER

## 2024-01-09 RX ORDER — SODIUM CHLORIDE 0.9 % (FLUSH) 0.9 %
5-40 SYRINGE (ML) INJECTION PRN
Status: DISCONTINUED | OUTPATIENT
Start: 2024-01-09 | End: 2024-01-09 | Stop reason: HOSPADM

## 2024-01-09 RX ORDER — ALBUTEROL SULFATE 2.5 MG/3ML
2.5 SOLUTION RESPIRATORY (INHALATION) AS NEEDED
Status: DISCONTINUED | OUTPATIENT
Start: 2024-01-09 | End: 2024-01-09 | Stop reason: HOSPADM

## 2024-01-09 RX ORDER — SODIUM CHLORIDE 0.9 % (FLUSH) 0.9 %
5-40 SYRINGE (ML) INJECTION EVERY 12 HOURS SCHEDULED
Status: DISCONTINUED | OUTPATIENT
Start: 2024-01-09 | End: 2024-01-09 | Stop reason: HOSPADM

## 2024-01-09 RX ORDER — SODIUM CHLORIDE 9 MG/ML
INJECTION, SOLUTION INTRAVENOUS PRN
Status: DISCONTINUED | OUTPATIENT
Start: 2024-01-09 | End: 2024-01-09 | Stop reason: HOSPADM

## 2024-01-09 RX ORDER — SODIUM CHLORIDE, SODIUM LACTATE, POTASSIUM CHLORIDE, CALCIUM CHLORIDE 600; 310; 30; 20 MG/100ML; MG/100ML; MG/100ML; MG/100ML
INJECTION, SOLUTION INTRAVENOUS CONTINUOUS
Status: DISCONTINUED | OUTPATIENT
Start: 2024-01-09 | End: 2024-01-09 | Stop reason: HOSPADM

## 2024-01-09 RX ADMIN — SODIUM CHLORIDE, POTASSIUM CHLORIDE, SODIUM LACTATE AND CALCIUM CHLORIDE: 600; 310; 30; 20 INJECTION, SOLUTION INTRAVENOUS at 10:31

## 2024-01-09 RX ADMIN — PROPOFOL 100 MG: 10 INJECTION, EMULSION INTRAVENOUS at 13:25

## 2024-01-09 RX ADMIN — PROPOFOL 100 MG: 10 INJECTION, EMULSION INTRAVENOUS at 13:20

## 2024-01-09 ASSESSMENT — PAIN - FUNCTIONAL ASSESSMENT
PAIN_FUNCTIONAL_ASSESSMENT: NONE - DENIES PAIN

## 2024-01-09 NOTE — OP NOTE
EGD procedure note    Date of procedure: 1/9/2024    Indication for procedure: Patient had reflux    Type of procedure: Upper endoscopy and balloon dilatation with a 60 Moldovan balloon dilator    Anesthesia classification: ASA class 2    Patient history and physical has been accomplished and documented.    Patient is assessed and determined to be an appropriate candidate for planned procedure and sedation.  The patient was assessed immediately prior to sedation.    Sedation plan: MAC per anesthesia.    Surgical assistant: Not applicable    Airway assessment: Range of motion: normal, mouth opening: Normal, visual obstruction: No.    PREOP EXAM:  Unchanged.    VS: Reviewed  Gen: WDWN in NAD  CV: RRR, no murmur  Resp: CTAB  Abd: Soft, NTND, +BS  Extrem: No cyanosis or edema  Neuro: Awake and alert      Informed consent obtained: Yes.  The indications, risks, including but not limited to bleeding, perforation, infection, death, potential for failure to visualize or diagnose neoplasia, alternatives, benefits, discussed in detail with the patient prior to the procedure.  Patient understands and agrees to the procedure.  Patient identity and procedure were verified, consent was obtained, and is consistent with the consent form in the patient's records.    INSTRUMENT: Olympus upper endoscope    PROCEDURE FINDINGS:    OROPHARYNX: Normal.  The oropharynx had no evidence of erythema or edema.    ESOPHAGUS:  Esophageal mucosa normal with no masses noted in the proximal, mid, and distal esophagus.   No endoscopic features of eosinophilic esophagitis were noted.  The Z-line was at 40 cm. and was abnormal with a thin Schatzki's ring present.    No hiatal hernia was noted distally.    Balloon dilatation was done due to complaints of dysphagia with a 60 Moldovan balloon dilator for 1 minute of insufflation.    STOMACH: Stomach was then evaluated including the cardia and fundus on retroflexion view.  Evaluation of the gastric body,

## 2024-01-09 NOTE — DISCHARGE INSTRUCTIONS
Recommendations:    1.  Continue present PPI therapy in the form of over-the-counter omeprazole 20 mg a day.    2.  Further recommendations pending clinical course as needed.  3.  Follow up as needed.

## 2024-01-09 NOTE — ANESTHESIA PRE PROCEDURE
\"COVID19\"        Anesthesia Evaluation    Airway: Mallampati: II          Dental: normal exam         Pulmonary:Negative Pulmonary ROS and normal exam  breath sounds clear to auscultation                             Cardiovascular:    (+) hypertension:                  Neuro/Psych:   (+) neuromuscular disease:, psychiatric history:            GI/Hepatic/Renal:   (+) GERD:          Endo/Other: Negative Endo/Other ROS                    Abdominal: normal exam      Abdomen: soft.      Vascular:          Other Findings:       Anesthesia Plan      MAC and TIVA     ASA 2       Induction: intravenous.      Anesthetic plan and risks discussed with patient.    Use of blood products discussed with patient whom.    Plan discussed with surgical team.                Syed Clark, APRN - CRNA   1/9/2024

## 2024-01-09 NOTE — ANESTHESIA POSTPROCEDURE EVALUATION
Department of Anesthesiology  Postprocedure Note    Patient: Sada Granda  MRN: [id number]  YOB: 1960  Date of evaluation: 1/9/2024    Procedure Summary       Date: 01/09/24 Room / Location: [facility] ENDO 01 / [facility] ENDOSCOPY    Anesthesia Start: 1316 Anesthesia Stop: 1332    Procedure: EGD with DILATION (Esophagus) Diagnosis:       Problems with swallowing and mastication      (Problems with swallowing and mastication [R13.10])    Surgeons: Sergio Hernández MD Responsible Provider: Syed Clark APRN - CRNA    Anesthesia Type: MAC ASA Status: 2            Anesthesia Type: MAC    Elsy Phase I: Elsy Score: 10    Elsy Phase II:      Anesthesia Post Evaluation    Patient location during evaluation: bedside  Patient participation: complete - patient participated  Level of consciousness: awake and awake and alert  Pain score: 0  Airway patency: patent  Nausea & Vomiting: no nausea  Cardiovascular status: blood pressure returned to baseline  Respiratory status: acceptable  Hydration status: euvolemic  Multimodal analgesia pain management approach  Pain management: adequate    No notable events documented.

## 2024-01-09 NOTE — INTERVAL H&P NOTE
Update History & Physical    The patient's History and Physical of January 9, 2024 was reviewed with the patient and I examined the patient. There was no change. The surgical site was confirmed by the patient and me.     Plan: The risks, benefits, expected outcome, and alternative to the recommended procedure have been discussed with the patient. Patient understands and wants to proceed with the procedure.     Electronically signed by Sergio Hernández MD on 1/9/2024 at 10:23 AM

## 2024-04-25 ENCOUNTER — TRANSCRIBE ORDERS (OUTPATIENT)
Age: 64
End: 2024-04-25

## 2024-04-25 ENCOUNTER — HOSPITAL ENCOUNTER (OUTPATIENT)
Age: 64
Discharge: HOME | End: 2024-04-25
Payer: MEDICARE

## 2024-04-25 DIAGNOSIS — M25.512 LEFT SHOULDER PAIN, UNSPECIFIED CHRONICITY: ICD-10-CM

## 2024-04-25 DIAGNOSIS — M25.512 LEFT SHOULDER PAIN, UNSPECIFIED CHRONICITY: Primary | ICD-10-CM

## 2024-04-25 PROCEDURE — 73030 X-RAY EXAM OF SHOULDER: CPT

## 2024-05-08 ENCOUNTER — HOSPITAL ENCOUNTER (OUTPATIENT)
Age: 64
Discharge: HOME | End: 2024-05-11
Payer: MEDICARE

## 2024-05-08 DIAGNOSIS — Z78.0 ASYMPTOMATIC MENOPAUSAL STATE: ICD-10-CM

## 2024-05-08 PROCEDURE — 77080 DXA BONE DENSITY AXIAL: CPT

## 2025-06-26 ENCOUNTER — TRANSCRIBE ORDERS (OUTPATIENT)
Facility: HOSPITAL | Age: 65
End: 2025-06-26

## 2025-06-26 DIAGNOSIS — M79.602 LEFT ARM PAIN: Primary | ICD-10-CM

## 2025-07-07 ENCOUNTER — HOSPITAL ENCOUNTER (OUTPATIENT)
Age: 65
Discharge: HOME | End: 2025-07-10
Payer: MEDICARE

## 2025-07-07 DIAGNOSIS — M79.602 LEFT ARM PAIN: ICD-10-CM

## 2025-07-07 PROCEDURE — 76882 US LMTD JT/FCL EVL NVASC XTR: CPT

## 2025-08-22 ENCOUNTER — TRANSCRIBE ORDERS (OUTPATIENT)
Facility: HOSPITAL | Age: 65
End: 2025-08-22

## 2025-08-22 DIAGNOSIS — Z12.31 VISIT FOR SCREENING MAMMOGRAM: Primary | ICD-10-CM

## (undated) DEVICE — DEVICE INFL 60ML 12ATM CONVENIENT LOK REL HNDL HI PRSS FLX

## (undated) DEVICE — SOL IRR STRL H2O 1000ML BTL --

## (undated) DEVICE — TUBING INSUFFLATION CAP W/ EXT CARBON DIOX ENDO SMARTCAP

## (undated) DEVICE — SINGLE-USE BIOPSY FORCEPS: Brand: RADIAL JAW 4

## (undated) DEVICE — SOL IRR STRL H2O 500ML STRL --

## (undated) DEVICE — TUBING, SUCTION, 9/32" X 10', STRAIGHT: Brand: MEDLINE

## (undated) DEVICE — KIT COLON W/ 1.1OZ LUB AND 2 END

## (undated) DEVICE — ENDOGATOR TUBING FOR BOSTON SCIENTIFIC ENDOSTAT II PUMP, OLYMPUS OFP PUMP OR ENDO STRATUS PUMP: Brand: ENDOGATOR

## (undated) DEVICE — Device: Brand: DEFENDO VALVE AND CONNECTOR KIT

## (undated) DEVICE — CONMED SCOPE SAVER BITE BLOCK, 20X27 MM: Brand: SCOPE SAVER

## (undated) DEVICE — GOWN,AURORA,FABRIC-REINFORCED,X-LARGE: Brand: MEDLINE

## (undated) DEVICE — SOLUTION IRRIG 1000ML STRL H2O USP PLAS POUR BTL

## (undated) DEVICE — CATHETER BALLOON DIL FIX WIRE 18-20 MMX7.5 FRX180 CM ELATION

## (undated) DEVICE — GLOVE SURG SZ 65 L12IN FNGR THK79MIL GRN LTX FREE

## (undated) DEVICE — SOLUTION IRRIG 500ML STRL H2O NONPYROGENIC